# Patient Record
Sex: FEMALE | Race: WHITE | Employment: OTHER | URBAN - METROPOLITAN AREA
[De-identification: names, ages, dates, MRNs, and addresses within clinical notes are randomized per-mention and may not be internally consistent; named-entity substitution may affect disease eponyms.]

---

## 2017-10-24 ENCOUNTER — OFFICE VISIT (OUTPATIENT)
Dept: ORTHOPEDIC SURGERY | Age: 47
End: 2017-10-24

## 2017-10-24 VITALS
HEIGHT: 69 IN | DIASTOLIC BLOOD PRESSURE: 69 MMHG | SYSTOLIC BLOOD PRESSURE: 108 MMHG | HEART RATE: 55 BPM | WEIGHT: 150 LBS | BODY MASS INDEX: 22.22 KG/M2

## 2017-10-24 DIAGNOSIS — M84.353A STRESS FRACTURE OF FEMUR, INITIAL ENCOUNTER: Primary | ICD-10-CM

## 2017-10-24 DIAGNOSIS — M79.604 RIGHT LEG PAIN: ICD-10-CM

## 2017-10-24 PROCEDURE — 99203 OFFICE O/P NEW LOW 30 MIN: CPT | Performed by: ORTHOPAEDIC SURGERY

## 2017-10-24 PROCEDURE — G8420 CALC BMI NORM PARAMETERS: HCPCS | Performed by: ORTHOPAEDIC SURGERY

## 2017-10-24 PROCEDURE — G8427 DOCREV CUR MEDS BY ELIG CLIN: HCPCS | Performed by: ORTHOPAEDIC SURGERY

## 2017-10-24 PROCEDURE — G8484 FLU IMMUNIZE NO ADMIN: HCPCS | Performed by: ORTHOPAEDIC SURGERY

## 2017-10-24 PROCEDURE — 1036F TOBACCO NON-USER: CPT | Performed by: ORTHOPAEDIC SURGERY

## 2017-10-24 ASSESSMENT — ENCOUNTER SYMPTOMS
BACK PAIN: 0
EYES NEGATIVE: 1
RESPIRATORY NEGATIVE: 1
GASTROINTESTINAL NEGATIVE: 1

## 2017-10-24 NOTE — PROGRESS NOTES
sensory deficit. Skin:    Head/Neck: inspection reveals no rashes, ulcerations or lesions. Trunk:  inspection reveals no rashes, ulcerations or lesions. Right Lower Extremity: inspection reveals no rashes, ulcerations or lesions. Left Lower Extremity: inspection reveals no rashes, ulcerations or lesions. Examination of the bilateral hips reveals normal flexion and extension. There is no restriction in rotation. There is no tenderness to palpation anteriorly posteriorly or laterally. Left thigh is benign. She has no tenderness. She has full range of motion. She has good strength. On the right side she has mild discomfort in the quad with straight leg raise at 80°. She has some mild tenderness. The torsional load placed across the femur she does have discomfort. Knee exam is normal.  Calf exam is normal.    Right femur x-rays obtained today AP and lateral views demonstrate:  Some mild irregularity at the junction of the middle and proximal third of the femur concerning for stress fracture    Assessment:    right femur stress fracture versus quadriceps strain          Plan: We need to proceed with an MRI scan to fully evaluate this injury. In the meantime she is restricted to no running. Follow-up with me upon completion of the MRI. This note was created using voice recognition software. It has been proofread, but occasionally errors remain. Please disregard these errors. They will be corrected as they are noted.

## 2017-10-27 ENCOUNTER — TELEPHONE (OUTPATIENT)
Dept: ORTHOPEDIC SURGERY | Age: 47
End: 2017-10-27

## 2017-10-27 NOTE — TELEPHONE ENCOUNTER
Called patient regarding appointment on Monday. No answer and voicemail box full. Unable to leave message to cancel appointment.

## 2017-10-30 ENCOUNTER — TELEPHONE (OUTPATIENT)
Dept: ORTHOPEDIC SURGERY | Age: 47
End: 2017-10-30

## 2017-10-30 NOTE — TELEPHONE ENCOUNTER
Returned call to patient. Unable to leave message voicemail is full. Note completed for patient to defer marathon. Dr. Ana Kimball notified.

## 2017-11-09 ENCOUNTER — OFFICE VISIT (OUTPATIENT)
Dept: ORTHOPEDIC SURGERY | Age: 47
End: 2017-11-09

## 2017-11-09 VITALS
WEIGHT: 150 LBS | DIASTOLIC BLOOD PRESSURE: 54 MMHG | BODY MASS INDEX: 22.22 KG/M2 | SYSTOLIC BLOOD PRESSURE: 114 MMHG | HEIGHT: 69 IN | HEART RATE: 53 BPM

## 2017-11-09 DIAGNOSIS — M84.353A STRESS FRACTURE OF FEMUR, INITIAL ENCOUNTER: ICD-10-CM

## 2017-11-09 DIAGNOSIS — M79.604 RIGHT LEG PAIN: Primary | ICD-10-CM

## 2017-11-09 PROCEDURE — G8484 FLU IMMUNIZE NO ADMIN: HCPCS | Performed by: ORTHOPAEDIC SURGERY

## 2017-11-09 PROCEDURE — G8427 DOCREV CUR MEDS BY ELIG CLIN: HCPCS | Performed by: ORTHOPAEDIC SURGERY

## 2017-11-09 PROCEDURE — G8420 CALC BMI NORM PARAMETERS: HCPCS | Performed by: ORTHOPAEDIC SURGERY

## 2017-11-09 PROCEDURE — 99213 OFFICE O/P EST LOW 20 MIN: CPT | Performed by: ORTHOPAEDIC SURGERY

## 2017-11-09 PROCEDURE — 1036F TOBACCO NON-USER: CPT | Performed by: ORTHOPAEDIC SURGERY

## 2017-11-09 NOTE — PROGRESS NOTES
Comfort Phillip returns today for her right femur. Pain is now about 2 out of 10. She is feeling a lot better. She no longer has pain with regular walking. She stumbled a few days ago and had mild discomfort but it resolved quickly. Patient's medications, allergies, past medical, surgical, social and family histories were reviewed and updated as appropriate. Relevant review of systems reviewed. General Exam:    Vitals: Blood pressure (!) 114/54, pulse 53, height 5' 9\" (1.753 m), weight 150 lb (68 kg), not currently breastfeeding. Constitutional: Patient is adequately groomed with no evidence of malnutrition  Mental Status: The patient is oriented to time, place and person. The patient's mood and affect are appropriate. She walks with normal gait. Left femur exam is normal.  There is no tenderness or discomfort. There is no restriction of motion. On the right side she has no tenderness. She is no pain with loading the femur stress applied or with axial loading. Neurologic and vascular exams of the right hip and knee are normal.    MRI scan of the right femur is reviewed. It demonstrates:  Periosteal edema or periosteal reaction along the medial aspect of the proximal to mid right    femoral diaphysis and trace medullary edema for which evolving stress injury or evolving    stress fracture is favored. No completed fracture. Nutrient vessel contributes to the signal    pattern within the femur. I reviewed these findings at length and the report and images with the patient. Assessment: Right femoral stress reaction. Plan: In my opinion she needs to continue activity modification. We discussed all these findings at length. Total face-to-face visit time was approximately 15 minutes. I recommend she take calcium with vitamin D supplementation. I'm recommending that she obtain a primary care doctor as well.   She can use the exercise bike or rowing machine for the next 6 weeks.  She can start elliptical  in December. She should not run until January. Follow-up with me in about 6 weeks. She agrees with this plan. This note was created using voice recognition software. It has been proofread, but occasionally errors remain. Please disregard these errors. They will be corrected as they are noted.

## 2019-01-15 ENCOUNTER — OFFICE VISIT (OUTPATIENT)
Dept: ORTHOPEDIC SURGERY | Age: 49
End: 2019-01-15
Payer: COMMERCIAL

## 2019-01-15 VITALS
SYSTOLIC BLOOD PRESSURE: 106 MMHG | BODY MASS INDEX: 23.7 KG/M2 | DIASTOLIC BLOOD PRESSURE: 61 MMHG | WEIGHT: 160 LBS | HEART RATE: 51 BPM | HEIGHT: 69 IN

## 2019-01-15 DIAGNOSIS — M70.51 PES ANSERINUS BURSITIS OF RIGHT KNEE: ICD-10-CM

## 2019-01-15 DIAGNOSIS — S83.241A ACUTE MEDIAL MENISCUS TEAR OF RIGHT KNEE, INITIAL ENCOUNTER: ICD-10-CM

## 2019-01-15 DIAGNOSIS — M25.561 ACUTE PAIN OF RIGHT KNEE: Primary | ICD-10-CM

## 2019-01-15 PROCEDURE — G8427 DOCREV CUR MEDS BY ELIG CLIN: HCPCS | Performed by: ORTHOPAEDIC SURGERY

## 2019-01-15 PROCEDURE — G8420 CALC BMI NORM PARAMETERS: HCPCS | Performed by: ORTHOPAEDIC SURGERY

## 2019-01-15 PROCEDURE — 1036F TOBACCO NON-USER: CPT | Performed by: ORTHOPAEDIC SURGERY

## 2019-01-15 PROCEDURE — G8484 FLU IMMUNIZE NO ADMIN: HCPCS | Performed by: ORTHOPAEDIC SURGERY

## 2019-01-15 PROCEDURE — 3017F COLORECTAL CA SCREEN DOC REV: CPT | Performed by: ORTHOPAEDIC SURGERY

## 2019-01-15 PROCEDURE — 99214 OFFICE O/P EST MOD 30 MIN: CPT | Performed by: ORTHOPAEDIC SURGERY

## 2019-01-15 ASSESSMENT — ENCOUNTER SYMPTOMS
RESPIRATORY NEGATIVE: 1
EYES NEGATIVE: 1
ALLERGIC/IMMUNOLOGIC NEGATIVE: 1
GASTROINTESTINAL NEGATIVE: 1

## 2019-01-17 ENCOUNTER — HOSPITAL ENCOUNTER (OUTPATIENT)
Dept: PHYSICAL THERAPY | Age: 49
Setting detail: THERAPIES SERIES
Discharge: HOME | End: 2019-01-17
Payer: COMMERCIAL

## 2019-01-17 PROCEDURE — 97112 NEUROMUSCULAR REEDUCATION: CPT

## 2019-01-17 PROCEDURE — 97140 MANUAL THERAPY 1/> REGIONS: CPT

## 2019-01-17 PROCEDURE — 97161 PT EVAL LOW COMPLEX 20 MIN: CPT

## 2019-01-17 PROCEDURE — G8978 MOBILITY CURRENT STATUS: HCPCS

## 2019-01-17 PROCEDURE — G8979 MOBILITY GOAL STATUS: HCPCS

## 2019-01-17 PROCEDURE — 97110 THERAPEUTIC EXERCISES: CPT

## 2019-01-24 ENCOUNTER — HOSPITAL ENCOUNTER (OUTPATIENT)
Dept: PHYSICAL THERAPY | Age: 49
Setting detail: THERAPIES SERIES
Discharge: HOME | End: 2019-01-24
Payer: COMMERCIAL

## 2019-01-24 PROCEDURE — 97140 MANUAL THERAPY 1/> REGIONS: CPT

## 2019-01-24 PROCEDURE — 97110 THERAPEUTIC EXERCISES: CPT

## 2019-01-24 PROCEDURE — 97112 NEUROMUSCULAR REEDUCATION: CPT

## 2019-01-31 ENCOUNTER — APPOINTMENT (OUTPATIENT)
Dept: PHYSICAL THERAPY | Age: 49
End: 2019-01-31
Payer: COMMERCIAL

## 2019-02-07 ENCOUNTER — HOSPITAL ENCOUNTER (OUTPATIENT)
Dept: PHYSICAL THERAPY | Age: 49
Setting detail: THERAPIES SERIES
Discharge: HOME | End: 2019-02-07
Payer: COMMERCIAL

## 2019-02-07 PROCEDURE — 97140 MANUAL THERAPY 1/> REGIONS: CPT

## 2019-02-07 PROCEDURE — 97110 THERAPEUTIC EXERCISES: CPT

## 2019-02-13 ENCOUNTER — HOSPITAL ENCOUNTER (OUTPATIENT)
Dept: PHYSICAL THERAPY | Age: 49
Setting detail: THERAPIES SERIES
Discharge: HOME | End: 2019-02-13
Payer: COMMERCIAL

## 2019-02-13 PROCEDURE — 97110 THERAPEUTIC EXERCISES: CPT

## 2019-02-13 PROCEDURE — 97140 MANUAL THERAPY 1/> REGIONS: CPT

## 2019-02-13 PROCEDURE — 97112 NEUROMUSCULAR REEDUCATION: CPT

## 2019-02-13 PROCEDURE — 97530 THERAPEUTIC ACTIVITIES: CPT

## 2019-02-14 ENCOUNTER — OFFICE VISIT (OUTPATIENT)
Dept: ORTHOPEDIC SURGERY | Age: 49
End: 2019-02-14
Payer: COMMERCIAL

## 2019-02-14 VITALS
HEIGHT: 69 IN | BODY MASS INDEX: 23.7 KG/M2 | HEART RATE: 59 BPM | SYSTOLIC BLOOD PRESSURE: 104 MMHG | WEIGHT: 160 LBS | DIASTOLIC BLOOD PRESSURE: 62 MMHG

## 2019-02-14 DIAGNOSIS — S83.241A ACUTE MEDIAL MENISCUS TEAR OF RIGHT KNEE, INITIAL ENCOUNTER: ICD-10-CM

## 2019-02-14 DIAGNOSIS — M25.561 ACUTE PAIN OF RIGHT KNEE: Primary | ICD-10-CM

## 2019-02-14 PROCEDURE — 3017F COLORECTAL CA SCREEN DOC REV: CPT | Performed by: ORTHOPAEDIC SURGERY

## 2019-02-14 PROCEDURE — G8420 CALC BMI NORM PARAMETERS: HCPCS | Performed by: ORTHOPAEDIC SURGERY

## 2019-02-14 PROCEDURE — 1036F TOBACCO NON-USER: CPT | Performed by: ORTHOPAEDIC SURGERY

## 2019-02-14 PROCEDURE — G8484 FLU IMMUNIZE NO ADMIN: HCPCS | Performed by: ORTHOPAEDIC SURGERY

## 2019-02-14 PROCEDURE — 99213 OFFICE O/P EST LOW 20 MIN: CPT | Performed by: ORTHOPAEDIC SURGERY

## 2019-02-14 PROCEDURE — G8427 DOCREV CUR MEDS BY ELIG CLIN: HCPCS | Performed by: ORTHOPAEDIC SURGERY

## 2019-02-20 ENCOUNTER — TELEPHONE (OUTPATIENT)
Dept: ORTHOPEDIC SURGERY | Age: 49
End: 2019-02-20

## 2019-03-04 ENCOUNTER — OFFICE VISIT (OUTPATIENT)
Dept: ORTHOPEDIC SURGERY | Age: 49
End: 2019-03-04
Payer: COMMERCIAL

## 2019-03-04 VITALS
WEIGHT: 160 LBS | DIASTOLIC BLOOD PRESSURE: 60 MMHG | BODY MASS INDEX: 23.7 KG/M2 | HEIGHT: 69 IN | HEART RATE: 62 BPM | SYSTOLIC BLOOD PRESSURE: 103 MMHG

## 2019-03-04 DIAGNOSIS — M25.561 ACUTE PAIN OF RIGHT KNEE: Primary | ICD-10-CM

## 2019-03-04 DIAGNOSIS — S83.241A ACUTE MEDIAL MENISCUS TEAR OF RIGHT KNEE, INITIAL ENCOUNTER: ICD-10-CM

## 2019-03-04 PROCEDURE — 1036F TOBACCO NON-USER: CPT | Performed by: ORTHOPAEDIC SURGERY

## 2019-03-04 PROCEDURE — 99213 OFFICE O/P EST LOW 20 MIN: CPT | Performed by: ORTHOPAEDIC SURGERY

## 2019-03-04 PROCEDURE — G8427 DOCREV CUR MEDS BY ELIG CLIN: HCPCS | Performed by: ORTHOPAEDIC SURGERY

## 2019-03-04 PROCEDURE — G8484 FLU IMMUNIZE NO ADMIN: HCPCS | Performed by: ORTHOPAEDIC SURGERY

## 2019-03-04 PROCEDURE — 3017F COLORECTAL CA SCREEN DOC REV: CPT | Performed by: ORTHOPAEDIC SURGERY

## 2019-03-04 PROCEDURE — G8420 CALC BMI NORM PARAMETERS: HCPCS | Performed by: ORTHOPAEDIC SURGERY

## 2019-10-07 ENCOUNTER — OFFICE VISIT (OUTPATIENT)
Dept: ORTHOPEDIC SURGERY | Age: 49
End: 2019-10-07
Payer: COMMERCIAL

## 2019-10-07 VITALS
BODY MASS INDEX: 23.7 KG/M2 | SYSTOLIC BLOOD PRESSURE: 118 MMHG | HEIGHT: 69 IN | DIASTOLIC BLOOD PRESSURE: 57 MMHG | HEART RATE: 52 BPM | WEIGHT: 160 LBS

## 2019-10-07 DIAGNOSIS — M25.461 EFFUSION OF RIGHT KNEE: ICD-10-CM

## 2019-10-07 DIAGNOSIS — S83.241A ACUTE MEDIAL MENISCUS TEAR OF RIGHT KNEE, INITIAL ENCOUNTER: ICD-10-CM

## 2019-10-07 DIAGNOSIS — M25.561 ACUTE PAIN OF RIGHT KNEE: Primary | ICD-10-CM

## 2019-10-07 PROCEDURE — 3017F COLORECTAL CA SCREEN DOC REV: CPT | Performed by: ORTHOPAEDIC SURGERY

## 2019-10-07 PROCEDURE — 99213 OFFICE O/P EST LOW 20 MIN: CPT | Performed by: ORTHOPAEDIC SURGERY

## 2019-10-07 PROCEDURE — 1036F TOBACCO NON-USER: CPT | Performed by: ORTHOPAEDIC SURGERY

## 2019-10-07 PROCEDURE — G8427 DOCREV CUR MEDS BY ELIG CLIN: HCPCS | Performed by: ORTHOPAEDIC SURGERY

## 2019-10-07 PROCEDURE — G8420 CALC BMI NORM PARAMETERS: HCPCS | Performed by: ORTHOPAEDIC SURGERY

## 2019-10-07 PROCEDURE — G8484 FLU IMMUNIZE NO ADMIN: HCPCS | Performed by: ORTHOPAEDIC SURGERY

## 2019-10-10 ENCOUNTER — OFFICE VISIT (OUTPATIENT)
Dept: INTERNAL MEDICINE CLINIC | Age: 49
End: 2019-10-10
Payer: COMMERCIAL

## 2019-10-10 VITALS
HEIGHT: 69 IN | HEART RATE: 54 BPM | WEIGHT: 163.4 LBS | DIASTOLIC BLOOD PRESSURE: 68 MMHG | SYSTOLIC BLOOD PRESSURE: 103 MMHG | TEMPERATURE: 97.9 F | OXYGEN SATURATION: 98 % | BODY MASS INDEX: 24.2 KG/M2 | RESPIRATION RATE: 16 BRPM

## 2019-10-10 DIAGNOSIS — E78.2 MIXED HYPERLIPIDEMIA: ICD-10-CM

## 2019-10-10 DIAGNOSIS — S83.206D ACUTE TORN MENISCUS OF KNEE, RIGHT, SUBSEQUENT ENCOUNTER: ICD-10-CM

## 2019-10-10 DIAGNOSIS — Z76.89 ENCOUNTER TO ESTABLISH CARE: Primary | ICD-10-CM

## 2019-10-10 DIAGNOSIS — K58.0 IRRITABLE BOWEL SYNDROME WITH DIARRHEA: ICD-10-CM

## 2019-10-10 PROCEDURE — 3017F COLORECTAL CA SCREEN DOC REV: CPT | Performed by: NURSE PRACTITIONER

## 2019-10-10 PROCEDURE — G8420 CALC BMI NORM PARAMETERS: HCPCS | Performed by: NURSE PRACTITIONER

## 2019-10-10 PROCEDURE — 1036F TOBACCO NON-USER: CPT | Performed by: NURSE PRACTITIONER

## 2019-10-10 PROCEDURE — G8427 DOCREV CUR MEDS BY ELIG CLIN: HCPCS | Performed by: NURSE PRACTITIONER

## 2019-10-10 PROCEDURE — G8484 FLU IMMUNIZE NO ADMIN: HCPCS | Performed by: NURSE PRACTITIONER

## 2019-10-10 PROCEDURE — 99205 OFFICE O/P NEW HI 60 MIN: CPT | Performed by: NURSE PRACTITIONER

## 2019-10-10 SDOH — HEALTH STABILITY: MENTAL HEALTH: HOW OFTEN DO YOU HAVE A DRINK CONTAINING ALCOHOL?: MONTHLY OR LESS

## 2019-10-10 ASSESSMENT — ENCOUNTER SYMPTOMS
BACK PAIN: 0
CHEST TIGHTNESS: 0
SHORTNESS OF BREATH: 0
SINUS PRESSURE: 0
VOMITING: 0
SINUS PAIN: 0
ABDOMINAL PAIN: 0
RHINORRHEA: 0
WHEEZING: 0
TROUBLE SWALLOWING: 0
COUGH: 0
NAUSEA: 0
COLOR CHANGE: 0
EYE PAIN: 0
CONSTIPATION: 0
SORE THROAT: 0
PHOTOPHOBIA: 0
DIARRHEA: 0

## 2019-10-10 ASSESSMENT — PATIENT HEALTH QUESTIONNAIRE - PHQ9
SUM OF ALL RESPONSES TO PHQ QUESTIONS 1-9: 0
1. LITTLE INTEREST OR PLEASURE IN DOING THINGS: 0
2. FEELING DOWN, DEPRESSED OR HOPELESS: 0
SUM OF ALL RESPONSES TO PHQ QUESTIONS 1-9: 0
SUM OF ALL RESPONSES TO PHQ9 QUESTIONS 1 & 2: 0

## 2019-10-11 ENCOUNTER — TELEPHONE (OUTPATIENT)
Dept: ORTHOPEDIC SURGERY | Age: 49
End: 2019-10-11

## 2019-10-15 ENCOUNTER — OFFICE VISIT (OUTPATIENT)
Dept: INTERNAL MEDICINE CLINIC | Age: 49
End: 2019-10-15
Payer: COMMERCIAL

## 2019-10-15 VITALS
TEMPERATURE: 97.7 F | SYSTOLIC BLOOD PRESSURE: 110 MMHG | OXYGEN SATURATION: 100 % | HEIGHT: 69 IN | BODY MASS INDEX: 23.99 KG/M2 | HEART RATE: 54 BPM | WEIGHT: 162 LBS | DIASTOLIC BLOOD PRESSURE: 70 MMHG | RESPIRATION RATE: 16 BRPM

## 2019-10-15 DIAGNOSIS — S83.241S ACUTE MEDIAL MENISCUS TEAR OF RIGHT KNEE, SEQUELA: ICD-10-CM

## 2019-10-15 DIAGNOSIS — E78.2 MIXED HYPERLIPIDEMIA: ICD-10-CM

## 2019-10-15 DIAGNOSIS — Z01.818 PRE-OP EXAMINATION: Primary | ICD-10-CM

## 2019-10-15 PROCEDURE — 93000 ELECTROCARDIOGRAM COMPLETE: CPT | Performed by: NURSE PRACTITIONER

## 2019-10-15 PROCEDURE — 1036F TOBACCO NON-USER: CPT | Performed by: NURSE PRACTITIONER

## 2019-10-15 PROCEDURE — 3017F COLORECTAL CA SCREEN DOC REV: CPT | Performed by: NURSE PRACTITIONER

## 2019-10-15 PROCEDURE — 99215 OFFICE O/P EST HI 40 MIN: CPT | Performed by: NURSE PRACTITIONER

## 2019-10-15 PROCEDURE — G8427 DOCREV CUR MEDS BY ELIG CLIN: HCPCS | Performed by: NURSE PRACTITIONER

## 2019-10-15 PROCEDURE — G8420 CALC BMI NORM PARAMETERS: HCPCS | Performed by: NURSE PRACTITIONER

## 2019-10-15 PROCEDURE — G8484 FLU IMMUNIZE NO ADMIN: HCPCS | Performed by: NURSE PRACTITIONER

## 2019-10-15 ASSESSMENT — ENCOUNTER SYMPTOMS
ABDOMINAL PAIN: 0
PHOTOPHOBIA: 0
SINUS PAIN: 0
DIARRHEA: 0
EYE PAIN: 0
RHINORRHEA: 0
SORE THROAT: 0
NAUSEA: 0
SINUS PRESSURE: 0
SHORTNESS OF BREATH: 0
BACK PAIN: 0
CHEST TIGHTNESS: 0
COUGH: 0
TROUBLE SWALLOWING: 0
CONSTIPATION: 0
COLOR CHANGE: 0
VOMITING: 0
WHEEZING: 0

## 2019-10-22 ENCOUNTER — TELEPHONE (OUTPATIENT)
Dept: ORTHOPEDIC SURGERY | Age: 49
End: 2019-10-22

## 2019-10-22 ENCOUNTER — ANESTHESIA EVENT (OUTPATIENT)
Dept: OPERATING ROOM | Age: 49
End: 2019-10-22
Payer: COMMERCIAL

## 2019-10-22 DIAGNOSIS — S83.241A ACUTE MEDIAL MENISCUS TEAR OF RIGHT KNEE, INITIAL ENCOUNTER: ICD-10-CM

## 2019-10-22 DIAGNOSIS — M25.561 ACUTE PAIN OF RIGHT KNEE: Primary | ICD-10-CM

## 2019-10-22 DIAGNOSIS — M25.461 EFFUSION OF RIGHT KNEE: ICD-10-CM

## 2019-10-22 RX ORDER — HYDROCODONE BITARTRATE AND ACETAMINOPHEN 5; 325 MG/1; MG/1
1 TABLET ORAL EVERY 6 HOURS PRN
Qty: 15 TABLET | Refills: 0 | Status: SHIPPED | OUTPATIENT
Start: 2019-10-23 | End: 2019-10-28

## 2019-10-22 RX ORDER — PROMETHAZINE HYDROCHLORIDE 25 MG/1
25 TABLET ORAL EVERY 6 HOURS PRN
Qty: 30 TABLET | Refills: 0 | Status: SHIPPED | OUTPATIENT
Start: 2019-10-23 | End: 2019-10-29

## 2019-10-22 RX ORDER — DOCUSATE SODIUM 100 MG/1
100 CAPSULE, LIQUID FILLED ORAL 2 TIMES DAILY
Qty: 60 CAPSULE | Refills: 0 | Status: SHIPPED | OUTPATIENT
Start: 2019-10-23 | End: 2020-05-08 | Stop reason: ALTCHOICE

## 2019-10-23 ENCOUNTER — ANESTHESIA (OUTPATIENT)
Dept: OPERATING ROOM | Age: 49
End: 2019-10-23
Payer: COMMERCIAL

## 2019-10-23 ENCOUNTER — HOSPITAL ENCOUNTER (OUTPATIENT)
Age: 49
Setting detail: OUTPATIENT SURGERY
Discharge: HOME | End: 2019-10-23
Attending: ORTHOPAEDIC SURGERY | Admitting: ORTHOPAEDIC SURGERY
Payer: COMMERCIAL

## 2019-10-23 VITALS
TEMPERATURE: 96.8 F | OXYGEN SATURATION: 99 % | RESPIRATION RATE: 1 BRPM | SYSTOLIC BLOOD PRESSURE: 85 MMHG | DIASTOLIC BLOOD PRESSURE: 52 MMHG

## 2019-10-23 VITALS
OXYGEN SATURATION: 97 % | WEIGHT: 159.61 LBS | TEMPERATURE: 97.4 F | BODY MASS INDEX: 22.85 KG/M2 | HEIGHT: 70 IN | RESPIRATION RATE: 16 BRPM | HEART RATE: 56 BPM | SYSTOLIC BLOOD PRESSURE: 120 MMHG | DIASTOLIC BLOOD PRESSURE: 75 MMHG

## 2019-10-23 PROCEDURE — 2500000003 HC RX 250 WO HCPCS

## 2019-10-23 PROCEDURE — 3600000004 HC SURGERY LEVEL 4 BASE: Performed by: ORTHOPAEDIC SURGERY

## 2019-10-23 PROCEDURE — 7100000001 HC PACU RECOVERY - ADDTL 15 MIN: Performed by: ORTHOPAEDIC SURGERY

## 2019-10-23 PROCEDURE — 6360000002 HC RX W HCPCS: Performed by: ANESTHESIOLOGY

## 2019-10-23 PROCEDURE — 2500000003 HC RX 250 WO HCPCS: Performed by: ORTHOPAEDIC SURGERY

## 2019-10-23 PROCEDURE — 2580000003 HC RX 258: Performed by: ANESTHESIOLOGY

## 2019-10-23 PROCEDURE — 6360000002 HC RX W HCPCS

## 2019-10-23 PROCEDURE — 6360000002 HC RX W HCPCS: Performed by: ORTHOPAEDIC SURGERY

## 2019-10-23 PROCEDURE — 3600000014 HC SURGERY LEVEL 4 ADDTL 15MIN: Performed by: ORTHOPAEDIC SURGERY

## 2019-10-23 PROCEDURE — 2720000010 HC SURG SUPPLY STERILE: Performed by: ORTHOPAEDIC SURGERY

## 2019-10-23 PROCEDURE — 3700000000 HC ANESTHESIA ATTENDED CARE: Performed by: ORTHOPAEDIC SURGERY

## 2019-10-23 PROCEDURE — 2580000003 HC RX 258: Performed by: ORTHOPAEDIC SURGERY

## 2019-10-23 PROCEDURE — 7100000000 HC PACU RECOVERY - FIRST 15 MIN: Performed by: ORTHOPAEDIC SURGERY

## 2019-10-23 PROCEDURE — 7100000011 HC PHASE II RECOVERY - ADDTL 15 MIN: Performed by: ORTHOPAEDIC SURGERY

## 2019-10-23 PROCEDURE — 2709999900 HC NON-CHARGEABLE SUPPLY: Performed by: ORTHOPAEDIC SURGERY

## 2019-10-23 PROCEDURE — 7100000010 HC PHASE II RECOVERY - FIRST 15 MIN: Performed by: ORTHOPAEDIC SURGERY

## 2019-10-23 PROCEDURE — 3700000001 HC ADD 15 MINUTES (ANESTHESIA): Performed by: ORTHOPAEDIC SURGERY

## 2019-10-23 RX ORDER — SODIUM CHLORIDE 0.9 % (FLUSH) 0.9 %
10 SYRINGE (ML) INJECTION PRN
Status: DISCONTINUED | OUTPATIENT
Start: 2019-10-23 | End: 2019-10-23 | Stop reason: HOSPADM

## 2019-10-23 RX ORDER — ONDANSETRON 2 MG/ML
INJECTION INTRAMUSCULAR; INTRAVENOUS PRN
Status: DISCONTINUED | OUTPATIENT
Start: 2019-10-23 | End: 2019-10-23 | Stop reason: SDUPTHER

## 2019-10-23 RX ORDER — OXYCODONE HYDROCHLORIDE AND ACETAMINOPHEN 5; 325 MG/1; MG/1
1 TABLET ORAL PRN
Status: DISCONTINUED | OUTPATIENT
Start: 2019-10-23 | End: 2019-10-23 | Stop reason: HOSPADM

## 2019-10-23 RX ORDER — ONDANSETRON 2 MG/ML
4 INJECTION INTRAMUSCULAR; INTRAVENOUS
Status: DISCONTINUED | OUTPATIENT
Start: 2019-10-23 | End: 2019-10-23 | Stop reason: HOSPADM

## 2019-10-23 RX ORDER — GLYCOPYRROLATE 0.2 MG/ML
INJECTION INTRAMUSCULAR; INTRAVENOUS PRN
Status: DISCONTINUED | OUTPATIENT
Start: 2019-10-23 | End: 2019-10-23 | Stop reason: SDUPTHER

## 2019-10-23 RX ORDER — SODIUM CHLORIDE 0.9 % (FLUSH) 0.9 %
10 SYRINGE (ML) INJECTION EVERY 12 HOURS SCHEDULED
Status: DISCONTINUED | OUTPATIENT
Start: 2019-10-23 | End: 2019-10-23 | Stop reason: HOSPADM

## 2019-10-23 RX ORDER — FENTANYL CITRATE 50 UG/ML
INJECTION, SOLUTION INTRAMUSCULAR; INTRAVENOUS PRN
Status: DISCONTINUED | OUTPATIENT
Start: 2019-10-23 | End: 2019-10-23 | Stop reason: SDUPTHER

## 2019-10-23 RX ORDER — BUPIVACAINE HYDROCHLORIDE AND EPINEPHRINE 2.5; 5 MG/ML; UG/ML
INJECTION, SOLUTION EPIDURAL; INFILTRATION; INTRACAUDAL; PERINEURAL
Status: COMPLETED | OUTPATIENT
Start: 2019-10-23 | End: 2019-10-23

## 2019-10-23 RX ORDER — FENTANYL CITRATE 50 UG/ML
25 INJECTION, SOLUTION INTRAMUSCULAR; INTRAVENOUS EVERY 5 MIN PRN
Status: DISCONTINUED | OUTPATIENT
Start: 2019-10-23 | End: 2019-10-23 | Stop reason: HOSPADM

## 2019-10-23 RX ORDER — SODIUM CHLORIDE, SODIUM LACTATE, POTASSIUM CHLORIDE, CALCIUM CHLORIDE 600; 310; 30; 20 MG/100ML; MG/100ML; MG/100ML; MG/100ML
INJECTION, SOLUTION INTRAVENOUS CONTINUOUS PRN
Status: COMPLETED | OUTPATIENT
Start: 2019-10-23 | End: 2019-10-23

## 2019-10-23 RX ORDER — OXYCODONE HYDROCHLORIDE AND ACETAMINOPHEN 5; 325 MG/1; MG/1
2 TABLET ORAL PRN
Status: DISCONTINUED | OUTPATIENT
Start: 2019-10-23 | End: 2019-10-23 | Stop reason: HOSPADM

## 2019-10-23 RX ORDER — APREPITANT 40 MG/1
40 CAPSULE ORAL ONCE
Status: COMPLETED | OUTPATIENT
Start: 2019-10-23 | End: 2019-10-23

## 2019-10-23 RX ORDER — PROPOFOL 10 MG/ML
INJECTION, EMULSION INTRAVENOUS PRN
Status: DISCONTINUED | OUTPATIENT
Start: 2019-10-23 | End: 2019-10-23 | Stop reason: SDUPTHER

## 2019-10-23 RX ORDER — SODIUM CHLORIDE 9 MG/ML
INJECTION, SOLUTION INTRAVENOUS CONTINUOUS
Status: DISCONTINUED | OUTPATIENT
Start: 2019-10-23 | End: 2019-10-23 | Stop reason: HOSPADM

## 2019-10-23 RX ORDER — KETOROLAC TROMETHAMINE 30 MG/ML
INJECTION, SOLUTION INTRAMUSCULAR; INTRAVENOUS PRN
Status: DISCONTINUED | OUTPATIENT
Start: 2019-10-23 | End: 2019-10-23 | Stop reason: SDUPTHER

## 2019-10-23 RX ORDER — DEXAMETHASONE SODIUM PHOSPHATE 4 MG/ML
INJECTION, SOLUTION INTRA-ARTICULAR; INTRALESIONAL; INTRAMUSCULAR; INTRAVENOUS; SOFT TISSUE PRN
Status: DISCONTINUED | OUTPATIENT
Start: 2019-10-23 | End: 2019-10-23 | Stop reason: SDUPTHER

## 2019-10-23 RX ORDER — LIDOCAINE HYDROCHLORIDE 20 MG/ML
INJECTION, SOLUTION EPIDURAL; INFILTRATION; INTRACAUDAL; PERINEURAL PRN
Status: DISCONTINUED | OUTPATIENT
Start: 2019-10-23 | End: 2019-10-23 | Stop reason: SDUPTHER

## 2019-10-23 RX ORDER — MIDAZOLAM HYDROCHLORIDE 1 MG/ML
INJECTION INTRAMUSCULAR; INTRAVENOUS PRN
Status: DISCONTINUED | OUTPATIENT
Start: 2019-10-23 | End: 2019-10-23 | Stop reason: SDUPTHER

## 2019-10-23 RX ADMIN — PROPOFOL 150 MG: 10 INJECTION, EMULSION INTRAVENOUS at 11:51

## 2019-10-23 RX ADMIN — SODIUM CHLORIDE: 9 INJECTION, SOLUTION INTRAVENOUS at 10:11

## 2019-10-23 RX ADMIN — SODIUM CHLORIDE: 9 INJECTION, SOLUTION INTRAVENOUS at 12:19

## 2019-10-23 RX ADMIN — FENTANYL CITRATE 50 MCG: 50 INJECTION INTRAMUSCULAR; INTRAVENOUS at 11:51

## 2019-10-23 RX ADMIN — Medication 2 G: at 11:40

## 2019-10-23 RX ADMIN — MIDAZOLAM 2 MG: 1 INJECTION INTRAMUSCULAR; INTRAVENOUS at 11:40

## 2019-10-23 RX ADMIN — FENTANYL CITRATE 50 MCG: 50 INJECTION INTRAMUSCULAR; INTRAVENOUS at 11:40

## 2019-10-23 RX ADMIN — DEXAMETHASONE SODIUM PHOSPHATE 8 MG: 4 INJECTION, SOLUTION INTRAMUSCULAR; INTRAVENOUS at 12:00

## 2019-10-23 RX ADMIN — ONDANSETRON 4 MG: 2 INJECTION INTRAMUSCULAR; INTRAVENOUS at 12:00

## 2019-10-23 RX ADMIN — GLYCOPYRROLATE 0.2 MG: 0.2 INJECTION, SOLUTION INTRAMUSCULAR; INTRAVENOUS at 11:51

## 2019-10-23 RX ADMIN — LIDOCAINE HYDROCHLORIDE 50 MG: 20 INJECTION, SOLUTION EPIDURAL; INFILTRATION; INTRACAUDAL; PERINEURAL at 11:51

## 2019-10-23 RX ADMIN — APREPITANT 40 MG: 40 CAPSULE ORAL at 10:21

## 2019-10-23 RX ADMIN — KETOROLAC TROMETHAMINE 30 MG: 30 INJECTION, SOLUTION INTRAMUSCULAR at 12:00

## 2019-10-23 ASSESSMENT — PULMONARY FUNCTION TESTS
PIF_VALUE: 8
PIF_VALUE: 3
PIF_VALUE: 0
PIF_VALUE: 14
PIF_VALUE: 2
PIF_VALUE: 2
PIF_VALUE: 14
PIF_VALUE: 2
PIF_VALUE: 12
PIF_VALUE: 14
PIF_VALUE: 12
PIF_VALUE: 16
PIF_VALUE: 1
PIF_VALUE: 12
PIF_VALUE: 16
PIF_VALUE: 1
PIF_VALUE: 3
PIF_VALUE: 14
PIF_VALUE: 1
PIF_VALUE: 12
PIF_VALUE: 3
PIF_VALUE: 0
PIF_VALUE: 12
PIF_VALUE: 0
PIF_VALUE: 14
PIF_VALUE: 14
PIF_VALUE: 0
PIF_VALUE: 5
PIF_VALUE: 1
PIF_VALUE: 2
PIF_VALUE: 14
PIF_VALUE: 16
PIF_VALUE: 8
PIF_VALUE: 2
PIF_VALUE: 2
PIF_VALUE: 7
PIF_VALUE: 14
PIF_VALUE: 14
PIF_VALUE: 2
PIF_VALUE: 3
PIF_VALUE: 12
PIF_VALUE: 2
PIF_VALUE: 2

## 2019-10-23 ASSESSMENT — PAIN SCALES - GENERAL
PAINLEVEL_OUTOF10: 0

## 2019-10-23 ASSESSMENT — PAIN - FUNCTIONAL ASSESSMENT
PAIN_FUNCTIONAL_ASSESSMENT: 0-10
PAIN_FUNCTIONAL_ASSESSMENT: ACTIVITIES ARE NOT PREVENTED

## 2019-10-23 ASSESSMENT — PAIN DESCRIPTION - DESCRIPTORS: DESCRIPTORS: CONSTANT;ACHING

## 2019-10-24 ENCOUNTER — OFFICE VISIT (OUTPATIENT)
Dept: ORTHOPEDIC SURGERY | Age: 49
End: 2019-10-24

## 2019-10-24 ENCOUNTER — HOSPITAL ENCOUNTER (OUTPATIENT)
Dept: PHYSICAL THERAPY | Age: 49
Setting detail: THERAPIES SERIES
Discharge: HOME | End: 2019-10-24
Payer: COMMERCIAL

## 2019-10-24 VITALS — HEIGHT: 69 IN | BODY MASS INDEX: 23.7 KG/M2 | WEIGHT: 160 LBS

## 2019-10-24 DIAGNOSIS — S83.241D ACUTE MEDIAL MENISCUS TEAR OF RIGHT KNEE, SUBSEQUENT ENCOUNTER: ICD-10-CM

## 2019-10-24 DIAGNOSIS — M25.561 ACUTE PAIN OF RIGHT KNEE: Primary | ICD-10-CM

## 2019-10-24 PROCEDURE — 99024 POSTOP FOLLOW-UP VISIT: CPT | Performed by: ORTHOPAEDIC SURGERY

## 2019-10-24 PROCEDURE — 97161 PT EVAL LOW COMPLEX 20 MIN: CPT | Performed by: PHYSICAL THERAPIST

## 2019-10-24 PROCEDURE — 97016 VASOPNEUMATIC DEVICE THERAPY: CPT | Performed by: PHYSICAL THERAPIST

## 2019-10-24 PROCEDURE — 97110 THERAPEUTIC EXERCISES: CPT | Performed by: PHYSICAL THERAPIST

## 2019-10-24 PROCEDURE — 97530 THERAPEUTIC ACTIVITIES: CPT | Performed by: PHYSICAL THERAPIST

## 2019-10-28 ENCOUNTER — HOSPITAL ENCOUNTER (OUTPATIENT)
Dept: PHYSICAL THERAPY | Age: 49
Setting detail: THERAPIES SERIES
Discharge: HOME | End: 2019-10-28
Payer: COMMERCIAL

## 2019-10-28 PROCEDURE — 97530 THERAPEUTIC ACTIVITIES: CPT | Performed by: PHYSICAL THERAPIST

## 2019-10-28 PROCEDURE — 97110 THERAPEUTIC EXERCISES: CPT | Performed by: PHYSICAL THERAPIST

## 2019-10-28 PROCEDURE — 97016 VASOPNEUMATIC DEVICE THERAPY: CPT | Performed by: PHYSICAL THERAPIST

## 2019-10-31 ENCOUNTER — OFFICE VISIT (OUTPATIENT)
Dept: ORTHOPEDIC SURGERY | Age: 49
End: 2019-10-31

## 2019-10-31 ENCOUNTER — HOSPITAL ENCOUNTER (OUTPATIENT)
Dept: PHYSICAL THERAPY | Age: 49
Setting detail: THERAPIES SERIES
Discharge: HOME | End: 2019-10-31
Payer: COMMERCIAL

## 2019-10-31 VITALS — HEIGHT: 69 IN | WEIGHT: 160 LBS | BODY MASS INDEX: 23.7 KG/M2

## 2019-10-31 DIAGNOSIS — S83.241D ACUTE MEDIAL MENISCUS TEAR OF RIGHT KNEE, SUBSEQUENT ENCOUNTER: Primary | ICD-10-CM

## 2019-10-31 PROCEDURE — 97110 THERAPEUTIC EXERCISES: CPT | Performed by: PHYSICAL THERAPIST

## 2019-10-31 PROCEDURE — 97016 VASOPNEUMATIC DEVICE THERAPY: CPT | Performed by: PHYSICAL THERAPIST

## 2019-10-31 PROCEDURE — 99024 POSTOP FOLLOW-UP VISIT: CPT | Performed by: ORTHOPAEDIC SURGERY

## 2019-10-31 PROCEDURE — 97530 THERAPEUTIC ACTIVITIES: CPT | Performed by: PHYSICAL THERAPIST

## 2019-11-05 ENCOUNTER — HOSPITAL ENCOUNTER (OUTPATIENT)
Dept: PHYSICAL THERAPY | Age: 49
Setting detail: THERAPIES SERIES
Discharge: HOME | End: 2019-11-05
Payer: COMMERCIAL

## 2019-11-05 PROCEDURE — 97112 NEUROMUSCULAR REEDUCATION: CPT | Performed by: PHYSICAL THERAPIST

## 2019-11-05 PROCEDURE — 97016 VASOPNEUMATIC DEVICE THERAPY: CPT | Performed by: PHYSICAL THERAPIST

## 2019-11-05 PROCEDURE — 97530 THERAPEUTIC ACTIVITIES: CPT | Performed by: PHYSICAL THERAPIST

## 2019-11-05 PROCEDURE — 97110 THERAPEUTIC EXERCISES: CPT | Performed by: PHYSICAL THERAPIST

## 2019-11-08 ENCOUNTER — HOSPITAL ENCOUNTER (OUTPATIENT)
Dept: PHYSICAL THERAPY | Age: 49
Setting detail: THERAPIES SERIES
Discharge: HOME | End: 2019-11-08
Payer: COMMERCIAL

## 2019-11-08 PROCEDURE — 97016 VASOPNEUMATIC DEVICE THERAPY: CPT

## 2019-11-08 PROCEDURE — 97112 NEUROMUSCULAR REEDUCATION: CPT

## 2019-11-08 PROCEDURE — 97530 THERAPEUTIC ACTIVITIES: CPT

## 2019-11-08 PROCEDURE — 97110 THERAPEUTIC EXERCISES: CPT

## 2019-11-20 ENCOUNTER — HOSPITAL ENCOUNTER (OUTPATIENT)
Dept: PHYSICAL THERAPY | Age: 49
Setting detail: THERAPIES SERIES
Discharge: HOME | End: 2019-11-20
Payer: COMMERCIAL

## 2019-11-20 PROCEDURE — 97530 THERAPEUTIC ACTIVITIES: CPT | Performed by: PHYSICAL THERAPIST

## 2019-11-20 PROCEDURE — 97112 NEUROMUSCULAR REEDUCATION: CPT | Performed by: PHYSICAL THERAPIST

## 2019-11-20 PROCEDURE — 97110 THERAPEUTIC EXERCISES: CPT | Performed by: PHYSICAL THERAPIST

## 2019-11-22 ENCOUNTER — HOSPITAL ENCOUNTER (OUTPATIENT)
Dept: PHYSICAL THERAPY | Age: 49
Setting detail: THERAPIES SERIES
Discharge: HOME | End: 2019-11-22
Payer: COMMERCIAL

## 2019-11-22 PROCEDURE — 97112 NEUROMUSCULAR REEDUCATION: CPT

## 2019-11-22 PROCEDURE — 97110 THERAPEUTIC EXERCISES: CPT

## 2019-11-22 PROCEDURE — 97530 THERAPEUTIC ACTIVITIES: CPT

## 2019-11-22 PROCEDURE — 97016 VASOPNEUMATIC DEVICE THERAPY: CPT

## 2019-11-25 ENCOUNTER — OFFICE VISIT (OUTPATIENT)
Dept: ORTHOPEDIC SURGERY | Age: 49
End: 2019-11-25

## 2019-11-25 ENCOUNTER — HOSPITAL ENCOUNTER (OUTPATIENT)
Dept: PHYSICAL THERAPY | Age: 49
Setting detail: THERAPIES SERIES
Discharge: HOME | End: 2019-11-25
Payer: COMMERCIAL

## 2019-11-25 VITALS
WEIGHT: 160 LBS | BODY MASS INDEX: 22.9 KG/M2 | SYSTOLIC BLOOD PRESSURE: 115 MMHG | HEART RATE: 69 BPM | HEIGHT: 70 IN | DIASTOLIC BLOOD PRESSURE: 70 MMHG

## 2019-11-25 DIAGNOSIS — S83.241D ACUTE MEDIAL MENISCUS TEAR OF RIGHT KNEE, SUBSEQUENT ENCOUNTER: Primary | ICD-10-CM

## 2019-11-25 DIAGNOSIS — M25.561 ACUTE PAIN OF RIGHT KNEE: ICD-10-CM

## 2019-11-25 PROCEDURE — 99024 POSTOP FOLLOW-UP VISIT: CPT | Performed by: ORTHOPAEDIC SURGERY

## 2019-11-25 PROCEDURE — 97530 THERAPEUTIC ACTIVITIES: CPT

## 2019-11-25 PROCEDURE — 97016 VASOPNEUMATIC DEVICE THERAPY: CPT

## 2019-11-25 PROCEDURE — 97112 NEUROMUSCULAR REEDUCATION: CPT

## 2019-11-25 PROCEDURE — 97110 THERAPEUTIC EXERCISES: CPT

## 2020-02-17 ENCOUNTER — TELEPHONE (OUTPATIENT)
Dept: INTERNAL MEDICINE CLINIC | Age: 50
End: 2020-02-17

## 2020-02-24 ENCOUNTER — OFFICE VISIT (OUTPATIENT)
Dept: ORTHOPEDIC SURGERY | Age: 50
End: 2020-02-24
Payer: COMMERCIAL

## 2020-02-24 VITALS
HEART RATE: 54 BPM | SYSTOLIC BLOOD PRESSURE: 116 MMHG | BODY MASS INDEX: 23.7 KG/M2 | HEIGHT: 69 IN | DIASTOLIC BLOOD PRESSURE: 73 MMHG | WEIGHT: 160 LBS

## 2020-02-24 PROCEDURE — G8427 DOCREV CUR MEDS BY ELIG CLIN: HCPCS | Performed by: ORTHOPAEDIC SURGERY

## 2020-02-24 PROCEDURE — 3017F COLORECTAL CA SCREEN DOC REV: CPT | Performed by: ORTHOPAEDIC SURGERY

## 2020-02-24 PROCEDURE — 99214 OFFICE O/P EST MOD 30 MIN: CPT | Performed by: ORTHOPAEDIC SURGERY

## 2020-02-24 PROCEDURE — G8484 FLU IMMUNIZE NO ADMIN: HCPCS | Performed by: ORTHOPAEDIC SURGERY

## 2020-02-24 PROCEDURE — 1036F TOBACCO NON-USER: CPT | Performed by: ORTHOPAEDIC SURGERY

## 2020-02-24 PROCEDURE — G8420 CALC BMI NORM PARAMETERS: HCPCS | Performed by: ORTHOPAEDIC SURGERY

## 2020-02-24 PROCEDURE — G9899 SCRN MAM PERF RSLTS DOC: HCPCS | Performed by: ORTHOPAEDIC SURGERY

## 2020-02-24 ASSESSMENT — ENCOUNTER SYMPTOMS
ALLERGIC/IMMUNOLOGIC NEGATIVE: 1
RESPIRATORY NEGATIVE: 1
EYES NEGATIVE: 1
GASTROINTESTINAL NEGATIVE: 1

## 2020-02-24 NOTE — PROGRESS NOTES
Subjective:      Patient ID: Yumiko Caruso is a 44 y.o. female. Chief complaint: Left knee pain    She is here for evaluation of left knee pain that started 3 weeks ago after hiking. It is anteromedial, sharp and worse with going up stairs, getting up from a seated position and when sitting for long periods of times. Has associated swelling. She denies numbness and tingling or any radiation. She is tried ibuprofen 400 mg twice daily and ice with gradual improvement of the swelling. She has had no other treatments besides an Epsom salt bath. She denies any direct or specific trauma. She does endorse some subjective mechanical symptoms. She states this feels very similar to her prior knee on the right which was a meniscus tear that failed conservative management. Past medical, surgical, social, family history were reviewed  Medications and allergies reviewed    Review of Systems   Constitutional: Negative. HENT: Negative. Eyes: Negative. Respiratory: Negative. Cardiovascular: Negative. Gastrointestinal: Negative. Endocrine: Negative. Genitourinary: Negative. Musculoskeletal: Positive for arthralgias, gait problem and joint swelling. Left knee pain    Skin: Negative. Allergic/Immunologic: Negative. Hematological: Negative. Psychiatric/Behavioral: Negative. /73   Pulse 54   Ht 5' 9\" (1.753 m)   Wt 160 lb (72.6 kg)   LMP 08/03/2016   BMI 23.63 kg/m²     Objective:   Physical Exam  Vitals signs reviewed. Constitutional:       Appearance: Normal appearance. She is normal weight. HENT:      Head: Normocephalic. Nose: Nose normal.      Mouth/Throat:      Mouth: Mucous membranes are moist.      Pharynx: Oropharynx is clear. Eyes:      Conjunctiva/sclera: Conjunctivae normal.   Cardiovascular:      Rate and Rhythm: Normal rate. Pulses: Normal pulses.    Pulmonary:      Effort: Pulmonary effort is normal.   Skin:     General: Skin is warm and dry.   Neurological:      General: No focal deficit present. Mental Status: She is alert and oriented to person, place, and time. Mental status is at baseline. Psychiatric:         Mood and Affect: Mood normal.         Behavior: Behavior normal.         Thought Content: Thought content normal.         Judgment: Judgment normal.       Knee Examination: Left     Inspection: Small effusion  Alignment: slight valgus  Palpation: There is patellofemoral crepitus, there is medial joint line tenderness. Range of Motion:  2-120   Strength: Motor is grossly intact  Stability: 2mm medial and 3mm lateral opening, neg lachman, neg anterior drawer, neg posterior drawer,   Special Tests:   + flexion/rotation test  Neuro: Sensation equal bilateral lower extremities   Gait: slight limp  Vascular: 2+ posterior tibialis pulse        Contralateral Knee Comparison Examination: Right     Inspection:  No edema or effusion  Alignment: slight valgus  Palpation: There is patellofemoral crepitus, there is no joint line tenderness. Range of Motion:  0-130  Strength: grossly intact  Stability: 2mm medial and 3mm lateral opening, neg lachman, neg anterior drawer, neg posterior drawer,   Neuro: Sensation equal bilateral lower extremities  Vascular: 2+ posterior tibialis pulse    Imaging: X-ray 3 views of the left knee including bilateral weightbearing PA flexion views, lateral and merchant views were obtained, reviewed and interpreted by me today in clinic and are significant for well-preserved joint spaces, good alignment on patellofemoral joint, no large osteophytes or significant degenerative changes. Overall unremarkable x-rays    Assessment:      Left knee meniscus tear        Plan:      Pertinent imaging was reviewed. The etiology, natural history, and treatment options for the disorder were discussed.   The roles of activity modifications, medications, injections, physical therapy, and surgical interventions were all described

## 2020-03-02 ENCOUNTER — OFFICE VISIT (OUTPATIENT)
Dept: ORTHOPEDIC SURGERY | Age: 50
End: 2020-03-02
Payer: COMMERCIAL

## 2020-03-02 VITALS
SYSTOLIC BLOOD PRESSURE: 118 MMHG | BODY MASS INDEX: 23.7 KG/M2 | WEIGHT: 160 LBS | HEIGHT: 69 IN | DIASTOLIC BLOOD PRESSURE: 72 MMHG | HEART RATE: 52 BPM

## 2020-03-02 PROCEDURE — G8484 FLU IMMUNIZE NO ADMIN: HCPCS | Performed by: ORTHOPAEDIC SURGERY

## 2020-03-02 PROCEDURE — G9899 SCRN MAM PERF RSLTS DOC: HCPCS | Performed by: ORTHOPAEDIC SURGERY

## 2020-03-02 PROCEDURE — 1036F TOBACCO NON-USER: CPT | Performed by: ORTHOPAEDIC SURGERY

## 2020-03-02 PROCEDURE — 3017F COLORECTAL CA SCREEN DOC REV: CPT | Performed by: ORTHOPAEDIC SURGERY

## 2020-03-02 PROCEDURE — 99212 OFFICE O/P EST SF 10 MIN: CPT | Performed by: ORTHOPAEDIC SURGERY

## 2020-03-02 PROCEDURE — G8427 DOCREV CUR MEDS BY ELIG CLIN: HCPCS | Performed by: ORTHOPAEDIC SURGERY

## 2020-03-02 PROCEDURE — G8420 CALC BMI NORM PARAMETERS: HCPCS | Performed by: ORTHOPAEDIC SURGERY

## 2020-03-02 NOTE — PROGRESS NOTES
recognition software. It has been proofread, but occasionally errors remain. Please disregard these errors. They will be corrected as they are noted.

## 2020-03-06 ENCOUNTER — HOSPITAL ENCOUNTER (OUTPATIENT)
Dept: PHYSICAL THERAPY | Age: 50
Setting detail: THERAPIES SERIES
Discharge: HOME | End: 2020-03-06
Payer: COMMERCIAL

## 2020-03-06 PROCEDURE — 9990000026 HC GAP EVAL

## 2020-03-06 NOTE — FLOWSHEET NOTE
Lower Extremity Daily Performance Training Note  Date:  3/6/2020    Patient Name:  Lpóez Alarcon    :  1970  MRN: [id number]  Restrictions/Precautions:   Medical/Treatment Diagnosis Information:   ·  Left meniscus tear, prehab  ·    Insurance/Certification information:   Gap     Physician Information:   Dr. Gina Georges      Pain level: 3-4/10     Visit Number: 1 Eval    Subjective: see eval    Objective:  Observation:   LEFS 42.5%    Exercises:  Exercise/Equipment Resistance/Repetitions Other comments   CARDIO/WARM-UP     Bike     Treadmill          STRETCHING     Hamstring [id number]    Hip Flexion     ITB     Groin     Quad     Incline Calf     Figure 4 [id number]    Cross body [id number]         SLR     Supine HEP    Prone HEP    Abduction HEP    Adduction HEP    clamm shell x10    Inverted clamm shell x10         SLR+     STRENGTHENING     Machines:           Knee Extension (90-30) Addressed how to SL/DL/iso with block          Knee Flexion Addressed SL/DL/iso          Leg Press  Addressed SL/DL/iso            -Single Leg             -Press with Adductor Squeeze             -Press with Band             -Bounding           Cable Column:          -Forward walkouts          -Reverse walkouts          -Forward lunge          -Reverse lunge          -HIP FOCUS             -Lateral walkouts             -Lateral step ups             -Standing Clam Shell step ups          Stool Scoots     Bridges With glute sueeze x10    Deadlifts     Triple Threat on ball     Quadruped Hip Extension     Stationary Lunges     Walking Lunges     Reverse Lunges     Multidirectional Lunges     Step Ups     Wall Sits [id number] addressed proper positioning    Lateral Band Walks     Monster Walks     Mini Squats         -Wobble Board               -Forward               -Lateral     Hip Progression                -Side-lying Hip ABD         -Side-lying Hip FLX knee to elbow         -Clamshell         -Rhythmic Stabs           SL Balance Addressed to multitask and add in movement with upperbody                   Other Therapeutic Activities:     Home Exercise Program: we address all exercises she has already been performing with her IHEP and made sure she had no questions about them     Patient Education:  On how to increase weights, reps, and holds        Assessment: patient had her questions all answered regarding propor positioning of exercises and given new HEP told to give in recovery day for strength and alternate with a stretching day as she feels she has not been stretching enough  [x] Patient tolerated treatment well [] Patient limited by fatigue  [] Patient limited by pain  [] Patient limited by other medical complications  [] Other:     Prognosis: [x] Good [] Fair  [] Poor    Patient Requires Follow-up: [x] Yes  [] No    Plan:   [] Continue per plan of care [] Alter current plan (see comments)  [x] Plan of care initiated [] Hold pending MD visit [] Discharge  Plan for Next Session:progression of exercises, possible lunges if knee is feeling stronger         Electronically signed by:  Owen ATC     Tx was performed by DIOGENES as a part of the [facility] program following PT's recommendations/guidance.

## 2020-03-06 NOTE — PLAN OF CARE
[address]  Phone: [phone]   Fax: [phone]                                                          Patient: Julio Catherine   : 1970   MRN: [id number]  Referring Physician:        Evaluation Date: 3/6/2020      Medical Diagnosis Information: Left knee pain, prehab/strengthening                                                  Precautions/ Contra-indications:   Latex Allergy:  [x]NO      []YES  Preferred Language for Healthcare:   [x]English       []other:    SUBJECTIVE: Patient stated complaint: she has been having pain in the left leg and is planning on having surgery in April. She wants to feel stronger per surgery so she can recovery and get back into running. She notes that stairs are the most bothersome but at times sitting for long periods and then standing also cause her pain. She wants to work out but needs some guidance. Relevant Medical History:pervious knee surgery R side.   Functional Disability Index:LEFS 42.5%  Relevant Meds:   Ibuprofen PRN  Current pain:  3-4/10    Easing factors: rest, sitting  Provocative factors: long periods of sitting to standing it catches, stairs up and down    Type: []Constant   [x]Intermittent  []Radiating [x]Localized []other:     Numbness/Tingling: NA    Functional Limitations/Impairments: [x]Sitting []Standing [x]Walking    [x]Squatting/bending  [x]Stairs           []ADL's  []Transfers [x]Sports/Recreation [x]Other:Running    Occupation/School: Massage Therapist    Living Status/Prior Level of Function: Independent with ADLs and IADLs,    OBJECTIVE:     Joint mobility:    [x]Normal    []Hypo   []Hyper        Flexibility L R Comment   Hamstring      Gastroc      ITB      Quad                ROM PROM AROM Overpressure Comment    L R L R L R    Flexion          Extension                                  Strength L R Comment   Quad      Hamstring Gastroc      Hip flexor      Hip ABD                    Orthopedic Special Tests:  Special Test Results/Comment   Meniscal Click    Crepitus    Flexion Test    Valgus Laxity    Varus Laxity    Lachmans    Drop Back    Homans            Girth L R   Mid Patella     Suprapatellar     5cm above     15cm above                              [x] Patient history, allergies, meds reviewed. Medical chart reviewed. See intake form. Review Of Systems (ROS):  [x]Performed Review of systems (Integumentary, CardioPulmonary, Neurological) by intake and observation. Intake form has been scanned into medical record. Patient has been instructed to contact their primary care physician regarding ROS issues if not already being addressed at this time.       Co-morbidities/Complexities (which will affect course of rehabilitation):   []None           Arthritic conditions   []Rheumatoid arthritis (M05.9)  [x]Osteoarthritis (M19.91)   Cardiovascular conditions   []Hypertension (I10)  []Hyperlipidemia (E78.5)  []Angina pectoris (I20)  []Atherosclerosis (I70)   Musculoskeletal conditions   []Disc pathology   []Congenital spine pathologies   []Prior surgical intervention  []Osteoporosis (M81.8)  []Osteopenia (M85.8)   Endocrine conditions   []Hypothyroid (E03.9)  []Hyperthyroid Gastrointestinal conditions   []Constipation (K00.08)   Metabolic conditions   []Morbid obesity (E66.01)  []Diabetes type 1(E10.65) or 2 (E11.65)   []Neuropathy (G60.9)     Pulmonary conditions    []Asthma (J45)  []Coughing   []COPD (J44.9)   Psychological Disorders  []Anxiety (F41.9)  []Depression (F32.9)   []Other:   [x]Other:   Previous knee injury R side       Barriers to/and or personal factors that will affect rehab potential:              []Age  []Sex              []Motivation/Lack of Motivation                        []Co-Morbidities              []Cognitive Function, education/learning barriers              []Environmental, home barriers OA   []Signs/symptoms consistent with internal derangement of knee/Hip   []Signs/symptoms consistent with functional hip weakness/NMR control      []Signs/symptoms consistent with tendinitis/tendinosis    []signs/symptoms consistent with pathology which may benefit from Dry needling      []other:      Prognosis/Rehab Potential:      []Excellent   [x]Good    []Fair   []Poor    Tolerance of evaluation/treatment:    []Excellent   [x]Good    []Fair   []Poor    PLAN  Frequency/Duration:  FUA 1-2x for progression prior to surgery  Interventions:  [x]  Therapeutic exercise including: strength training, ROM, for Lower extremity and core   [x]  NMR activation and proprioception for LE, Glutes and Core   [x]  Manual therapy as indicated for LE, Hip and spine to include: Dry Needling/IASTM, STM, PROM, Gr I-IV mobilizations, manipulation. [x] Modalities as needed that may include: thermal agents, E-stim, Biofeedback, US, iontophoresis as indicated  [x] Patient education on joint protection, postural re-education, activity modification, progression of HEP. HEP instruction: Pt was instructed in, and safely and correctly demonstrated home exercise program. Patient verbalized understanding of proper frequency of exercises. Copy of exercises was scanned into patient chart and can be fount in the media file. GOALS:  Patient stated goal: return to running and stronger pre surgery        Electronically signed by:  DIOGENES Bone   Tx was performed by DIOGENES as a part of the [facility] program following PT's recommendations/guidance.

## 2020-03-19 ENCOUNTER — TELEPHONE (OUTPATIENT)
Dept: ORTHOPEDIC SURGERY | Age: 50
End: 2020-03-19

## 2020-03-26 ENCOUNTER — APPOINTMENT (OUTPATIENT)
Dept: PHYSICAL THERAPY | Age: 50
End: 2020-03-26
Payer: COMMERCIAL

## 2020-04-01 ENCOUNTER — TELEPHONE (OUTPATIENT)
Dept: ORTHOPEDIC SURGERY | Age: 50
End: 2020-04-01

## 2020-04-29 ENCOUNTER — TELEPHONE (OUTPATIENT)
Dept: ORTHOPEDIC SURGERY | Age: 50
End: 2020-04-29

## 2020-04-30 NOTE — TELEPHONE ENCOUNTER
Called and spoke with patient. Explained that we are waiting on [facility] administration to let us begin scheduling surgeries. Patient is on the list to be called once we are allowed to schedule.

## 2020-05-05 ENCOUNTER — TELEPHONE (OUTPATIENT)
Dept: INTERNAL MEDICINE CLINIC | Age: 50
End: 2020-05-05

## 2020-05-05 NOTE — TELEPHONE ENCOUNTER
Pt is having Meniscus surgery within the next week with Dr. Leigh Aldana.  She needs a preop. Can she go to [facility] to have it. Please let her know.

## 2020-05-08 ENCOUNTER — OFFICE VISIT (OUTPATIENT)
Dept: ORTHOPEDIC SURGERY | Age: 50
End: 2020-05-08
Payer: COMMERCIAL

## 2020-05-08 VITALS — BODY MASS INDEX: 23.62 KG/M2 | WEIGHT: 165 LBS | TEMPERATURE: 96.8 F | HEIGHT: 70 IN

## 2020-05-08 PROCEDURE — 3017F COLORECTAL CA SCREEN DOC REV: CPT | Performed by: ORTHOPAEDIC SURGERY

## 2020-05-08 PROCEDURE — 1036F TOBACCO NON-USER: CPT | Performed by: ORTHOPAEDIC SURGERY

## 2020-05-08 PROCEDURE — G8420 CALC BMI NORM PARAMETERS: HCPCS | Performed by: ORTHOPAEDIC SURGERY

## 2020-05-08 PROCEDURE — G9899 SCRN MAM PERF RSLTS DOC: HCPCS | Performed by: ORTHOPAEDIC SURGERY

## 2020-05-08 PROCEDURE — G8427 DOCREV CUR MEDS BY ELIG CLIN: HCPCS | Performed by: ORTHOPAEDIC SURGERY

## 2020-05-08 PROCEDURE — 99213 OFFICE O/P EST LOW 20 MIN: CPT | Performed by: ORTHOPAEDIC SURGERY

## 2020-05-11 PROBLEM — S83.242A TEAR OF MEDIAL MENISCUS OF LEFT KNEE, CURRENT: Status: ACTIVE | Noted: 2020-05-11

## 2020-05-11 PROBLEM — Z01.818 PREOP EXAMINATION: Status: ACTIVE | Noted: 2020-05-11

## 2020-05-12 LAB — SARS-COV-2: NOT DETECTED

## 2020-05-14 ENCOUNTER — ANESTHESIA EVENT (OUTPATIENT)
Dept: OPERATING ROOM | Age: 50
End: 2020-05-14
Payer: COMMERCIAL

## 2020-05-14 RX ORDER — DOCUSATE SODIUM 100 MG/1
100 CAPSULE, LIQUID FILLED ORAL 2 TIMES DAILY
Qty: 60 CAPSULE | Refills: 0 | Status: SHIPPED | OUTPATIENT
Start: 2020-05-15 | End: 2021-04-19 | Stop reason: ALTCHOICE

## 2020-05-14 RX ORDER — PROMETHAZINE HYDROCHLORIDE 25 MG/1
25 TABLET ORAL EVERY 6 HOURS PRN
Qty: 30 TABLET | Refills: 0 | Status: SHIPPED | OUTPATIENT
Start: 2020-05-15 | End: 2020-05-21

## 2020-05-14 RX ORDER — HYDROCODONE BITARTRATE AND ACETAMINOPHEN 5; 325 MG/1; MG/1
1 TABLET ORAL EVERY 4 HOURS PRN
Qty: 20 TABLET | Refills: 0 | Status: SHIPPED | OUTPATIENT
Start: 2020-05-15 | End: 2020-05-20

## 2020-05-15 ENCOUNTER — HOSPITAL ENCOUNTER (OUTPATIENT)
Age: 50
Setting detail: OUTPATIENT SURGERY
Discharge: HOME | End: 2020-05-15
Attending: ORTHOPAEDIC SURGERY | Admitting: ORTHOPAEDIC SURGERY
Payer: COMMERCIAL

## 2020-05-15 ENCOUNTER — ANESTHESIA (OUTPATIENT)
Dept: OPERATING ROOM | Age: 50
End: 2020-05-15
Payer: COMMERCIAL

## 2020-05-15 VITALS
DIASTOLIC BLOOD PRESSURE: 64 MMHG | RESPIRATION RATE: 14 BRPM | TEMPERATURE: 97.4 F | HEIGHT: 69 IN | BODY MASS INDEX: 24.96 KG/M2 | WEIGHT: 168.54 LBS | OXYGEN SATURATION: 100 % | SYSTOLIC BLOOD PRESSURE: 103 MMHG | HEART RATE: 60 BPM

## 2020-05-15 VITALS
OXYGEN SATURATION: 100 % | DIASTOLIC BLOOD PRESSURE: 51 MMHG | RESPIRATION RATE: 4 BRPM | TEMPERATURE: 96.3 F | SYSTOLIC BLOOD PRESSURE: 95 MMHG

## 2020-05-15 PROCEDURE — 2709999900 HC NON-CHARGEABLE SUPPLY: Performed by: ORTHOPAEDIC SURGERY

## 2020-05-15 PROCEDURE — 2500000003 HC RX 250 WO HCPCS: Performed by: ORTHOPAEDIC SURGERY

## 2020-05-15 PROCEDURE — 6360000002 HC RX W HCPCS: Performed by: ORTHOPAEDIC SURGERY

## 2020-05-15 PROCEDURE — 6360000002 HC RX W HCPCS: Performed by: NURSE ANESTHETIST, CERTIFIED REGISTERED

## 2020-05-15 PROCEDURE — 2720000010 HC SURG SUPPLY STERILE: Performed by: ORTHOPAEDIC SURGERY

## 2020-05-15 PROCEDURE — 3700000000 HC ANESTHESIA ATTENDED CARE: Performed by: ORTHOPAEDIC SURGERY

## 2020-05-15 PROCEDURE — 7100000000 HC PACU RECOVERY - FIRST 15 MIN: Performed by: ORTHOPAEDIC SURGERY

## 2020-05-15 PROCEDURE — 2580000003 HC RX 258: Performed by: ORTHOPAEDIC SURGERY

## 2020-05-15 PROCEDURE — 2500000003 HC RX 250 WO HCPCS: Performed by: NURSE ANESTHETIST, CERTIFIED REGISTERED

## 2020-05-15 PROCEDURE — 7100000010 HC PHASE II RECOVERY - FIRST 15 MIN: Performed by: ORTHOPAEDIC SURGERY

## 2020-05-15 PROCEDURE — 3600000014 HC SURGERY LEVEL 4 ADDTL 15MIN: Performed by: ORTHOPAEDIC SURGERY

## 2020-05-15 PROCEDURE — 3600000004 HC SURGERY LEVEL 4 BASE: Performed by: ORTHOPAEDIC SURGERY

## 2020-05-15 PROCEDURE — 7100000001 HC PACU RECOVERY - ADDTL 15 MIN: Performed by: ORTHOPAEDIC SURGERY

## 2020-05-15 PROCEDURE — 2580000003 HC RX 258: Performed by: ANESTHESIOLOGY

## 2020-05-15 PROCEDURE — 2500000003 HC RX 250 WO HCPCS

## 2020-05-15 PROCEDURE — 3700000001 HC ADD 15 MINUTES (ANESTHESIA): Performed by: ORTHOPAEDIC SURGERY

## 2020-05-15 PROCEDURE — 7100000011 HC PHASE II RECOVERY - ADDTL 15 MIN: Performed by: ORTHOPAEDIC SURGERY

## 2020-05-15 RX ORDER — DEXAMETHASONE SODIUM PHOSPHATE 4 MG/ML
INJECTION, SOLUTION INTRA-ARTICULAR; INTRALESIONAL; INTRAMUSCULAR; INTRAVENOUS; SOFT TISSUE PRN
Status: DISCONTINUED | OUTPATIENT
Start: 2020-05-15 | End: 2020-05-15 | Stop reason: SDUPTHER

## 2020-05-15 RX ORDER — SODIUM CHLORIDE, SODIUM LACTATE, POTASSIUM CHLORIDE, CALCIUM CHLORIDE 600; 310; 30; 20 MG/100ML; MG/100ML; MG/100ML; MG/100ML
INJECTION, SOLUTION INTRAVENOUS CONTINUOUS PRN
Status: COMPLETED | OUTPATIENT
Start: 2020-05-15 | End: 2020-05-15

## 2020-05-15 RX ORDER — SODIUM CHLORIDE 9 MG/ML
INJECTION, SOLUTION INTRAVENOUS CONTINUOUS
Status: DISCONTINUED | OUTPATIENT
Start: 2020-05-15 | End: 2020-05-15 | Stop reason: HOSPADM

## 2020-05-15 RX ORDER — FENTANYL CITRATE 50 UG/ML
25 INJECTION, SOLUTION INTRAMUSCULAR; INTRAVENOUS EVERY 5 MIN PRN
Status: DISCONTINUED | OUTPATIENT
Start: 2020-05-15 | End: 2020-05-15 | Stop reason: HOSPADM

## 2020-05-15 RX ORDER — SODIUM CHLORIDE 0.9 % (FLUSH) 0.9 %
10 SYRINGE (ML) INJECTION PRN
Status: DISCONTINUED | OUTPATIENT
Start: 2020-05-15 | End: 2020-05-15 | Stop reason: HOSPADM

## 2020-05-15 RX ORDER — BUPIVACAINE HYDROCHLORIDE AND EPINEPHRINE 2.5; 5 MG/ML; UG/ML
INJECTION, SOLUTION EPIDURAL; INFILTRATION; INTRACAUDAL; PERINEURAL
Status: COMPLETED | OUTPATIENT
Start: 2020-05-15 | End: 2020-05-15

## 2020-05-15 RX ORDER — MIDAZOLAM HYDROCHLORIDE 1 MG/ML
INJECTION INTRAMUSCULAR; INTRAVENOUS PRN
Status: DISCONTINUED | OUTPATIENT
Start: 2020-05-15 | End: 2020-05-15 | Stop reason: SDUPTHER

## 2020-05-15 RX ORDER — SUCCINYLCHOLINE/SOD CL,ISO/PF 200MG/10ML
SYRINGE (ML) INTRAVENOUS PRN
Status: DISCONTINUED | OUTPATIENT
Start: 2020-05-15 | End: 2020-05-15 | Stop reason: SDUPTHER

## 2020-05-15 RX ORDER — ONDANSETRON 2 MG/ML
4 INJECTION INTRAMUSCULAR; INTRAVENOUS
Status: DISCONTINUED | OUTPATIENT
Start: 2020-05-15 | End: 2020-05-15 | Stop reason: HOSPADM

## 2020-05-15 RX ORDER — ONDANSETRON 2 MG/ML
INJECTION INTRAMUSCULAR; INTRAVENOUS PRN
Status: DISCONTINUED | OUTPATIENT
Start: 2020-05-15 | End: 2020-05-15 | Stop reason: SDUPTHER

## 2020-05-15 RX ORDER — LIDOCAINE HYDROCHLORIDE 20 MG/ML
INJECTION, SOLUTION EPIDURAL; INFILTRATION; INTRACAUDAL; PERINEURAL PRN
Status: DISCONTINUED | OUTPATIENT
Start: 2020-05-15 | End: 2020-05-15 | Stop reason: SDUPTHER

## 2020-05-15 RX ORDER — ROCURONIUM BROMIDE 10 MG/ML
INJECTION, SOLUTION INTRAVENOUS PRN
Status: DISCONTINUED | OUTPATIENT
Start: 2020-05-15 | End: 2020-05-15 | Stop reason: SDUPTHER

## 2020-05-15 RX ORDER — PROPOFOL 10 MG/ML
INJECTION, EMULSION INTRAVENOUS PRN
Status: DISCONTINUED | OUTPATIENT
Start: 2020-05-15 | End: 2020-05-15 | Stop reason: SDUPTHER

## 2020-05-15 RX ORDER — SODIUM CHLORIDE 0.9 % (FLUSH) 0.9 %
10 SYRINGE (ML) INJECTION EVERY 12 HOURS SCHEDULED
Status: DISCONTINUED | OUTPATIENT
Start: 2020-05-15 | End: 2020-05-15 | Stop reason: HOSPADM

## 2020-05-15 RX ORDER — FENTANYL CITRATE 50 UG/ML
INJECTION, SOLUTION INTRAMUSCULAR; INTRAVENOUS PRN
Status: DISCONTINUED | OUTPATIENT
Start: 2020-05-15 | End: 2020-05-15 | Stop reason: SDUPTHER

## 2020-05-15 RX ADMIN — FENTANYL CITRATE 50 MCG: 50 INJECTION INTRAMUSCULAR; INTRAVENOUS at 09:46

## 2020-05-15 RX ADMIN — ROCURONIUM BROMIDE 5 MG: 10 INJECTION INTRAVENOUS at 09:46

## 2020-05-15 RX ADMIN — MIDAZOLAM 2 MG: 1 INJECTION INTRAMUSCULAR; INTRAVENOUS at 09:39

## 2020-05-15 RX ADMIN — PROPOFOL 150 MG: 10 INJECTION, EMULSION INTRAVENOUS at 09:46

## 2020-05-15 RX ADMIN — LIDOCAINE HYDROCHLORIDE 100 MG: 20 INJECTION, SOLUTION EPIDURAL; INFILTRATION; INTRACAUDAL; PERINEURAL at 09:46

## 2020-05-15 RX ADMIN — Medication 140 MG: at 09:46

## 2020-05-15 RX ADMIN — ONDANSETRON 4 MG: 2 INJECTION INTRAMUSCULAR; INTRAVENOUS at 09:39

## 2020-05-15 RX ADMIN — SODIUM CHLORIDE: 9 INJECTION, SOLUTION INTRAVENOUS at 09:31

## 2020-05-15 RX ADMIN — CEFAZOLIN SODIUM 2 G: 10 INJECTION, POWDER, FOR SOLUTION INTRAVENOUS at 09:41

## 2020-05-15 RX ADMIN — FENTANYL CITRATE 25 MCG: 50 INJECTION INTRAMUSCULAR; INTRAVENOUS at 10:20

## 2020-05-15 RX ADMIN — DEXAMETHASONE SODIUM PHOSPHATE 10 MG: 4 INJECTION, SOLUTION INTRAMUSCULAR; INTRAVENOUS at 09:57

## 2020-05-15 RX ADMIN — FENTANYL CITRATE 25 MCG: 50 INJECTION INTRAMUSCULAR; INTRAVENOUS at 10:13

## 2020-05-15 RX ADMIN — FAMOTIDINE 20 MG: 10 INJECTION, SOLUTION INTRAVENOUS at 09:39

## 2020-05-15 RX ADMIN — SUGAMMADEX 200 MG: 100 INJECTION, SOLUTION INTRAVENOUS at 10:18

## 2020-05-15 ASSESSMENT — PAIN SCALES - GENERAL
PAINLEVEL_OUTOF10: 0

## 2020-05-15 ASSESSMENT — PULMONARY FUNCTION TESTS
PIF_VALUE: 14
PIF_VALUE: 14
PIF_VALUE: 2
PIF_VALUE: 0
PIF_VALUE: 6
PIF_VALUE: 13
PIF_VALUE: 14
PIF_VALUE: 14
PIF_VALUE: 2
PIF_VALUE: 14
PIF_VALUE: 1
PIF_VALUE: 18
PIF_VALUE: 2
PIF_VALUE: 14
PIF_VALUE: 13
PIF_VALUE: 15
PIF_VALUE: 14
PIF_VALUE: 15
PIF_VALUE: 14
PIF_VALUE: 0
PIF_VALUE: 14
PIF_VALUE: 14
PIF_VALUE: 0
PIF_VALUE: 14
PIF_VALUE: 7
PIF_VALUE: 1
PIF_VALUE: 0
PIF_VALUE: 14
PIF_VALUE: 5
PIF_VALUE: 15
PIF_VALUE: 8
PIF_VALUE: 14
PIF_VALUE: 3
PIF_VALUE: 14
PIF_VALUE: 5
PIF_VALUE: 1

## 2020-05-15 ASSESSMENT — ENCOUNTER SYMPTOMS: SHORTNESS OF BREATH: 0

## 2020-05-15 ASSESSMENT — PAIN DESCRIPTION - DESCRIPTORS: DESCRIPTORS: ACHING

## 2020-05-15 ASSESSMENT — PAIN - FUNCTIONAL ASSESSMENT: PAIN_FUNCTIONAL_ASSESSMENT: 0-10

## 2020-05-15 NOTE — H&P
Pt seen and examined, left knee marked, no interval changes, Covid-. All questions answered, ready for surgery.

## 2020-05-15 NOTE — ANESTHESIA PRE PROCEDURE
[facility] Department of Anesthesiology  Pre-Anesthesia Evaluation/Consultation       Name:  Gamal Chacon  : 1970  Age:  44 y.o. MRN:  [id number]  Date: 5/15/2020           Surgeon: Surgeon(s):  Oneil Arana MD    Procedure: Procedure(s):  RIGHT KNEE ARTHROSCOPY MEDIAL MENISCECTOMY     Allergies   Allergen Reactions    Codeine Other (See Comments)     Drops heart rate  As child bp dropped     Patient Active Problem List   Diagnosis    Tear of medial meniscus of left knee, current    Preop examination     Past Medical History:   Diagnosis Date    Hyperlipidemia     Retention of urine     only after bladder suspension    Torn meniscus     right knee with mild OA     Past Surgical History:   Procedure Laterality Date    BLADDER SUSPENSION      BUNIONECTOMY Right 1984    KNEE ARTHROSCOPY Right 10/23/2019    RIGHT KNEE ARTHROSCOPY MEDIAL MENISCECTOMY performed by Oneil Arana MD at [address] [place] [address] Left     RECTOCELE REPAIR      WISDOM TOOTH EXTRACTION       Social History     Tobacco Use    Smoking status: Never Smoker    Smokeless tobacco: Never Used   Substance Use Topics    Alcohol use: Yes     Frequency: Monthly or less     Comment: rare    Drug use: Never     Medications  No current facility-administered medications on file prior to encounter.       Current Outpatient Medications on File Prior to Encounter   Medication Sig Dispense Refill    NONFORMULARY Take 1,125 mg by mouth Indications: rezume      NONFORMULARY Take 150 mg by mouth daily Indications: SYTRINOL      Omega-3 Fatty Acids (FISH OIL PO) Take by mouth      Nutritional Supplements (RHEUMATOID ARTHRITIS SUPPORT PO) Take by mouth       Current Facility-Administered Medications   Medication Dose Route Frequency Provider Last Rate Last Dose    0.9 % sodium chloride infusion   Intravenous Continuous Zelalem Plata MD        sodium chloride flush 0.9 % injection 10 mL  10 mL Intravenous 2 times per day Kenn Izquierdo MD        sodium chloride flush 0.9 % injection 10 mL  10 mL Intravenous PRN Kenn Izquierdo MD        ceFAZolin (ANCEF) 2 g in dextrose 5 % 100 mL IVPB  2 g Intravenous Once Ramin Lamar MD         Vital Signs (Current)   Vitals:    20 1414 05/15/20 0735 05/15/20 0738   BP:   (!) 10753   Pulse:  62    Resp:  16    Temp:  97.2 °F (36.2 °C)    TempSrc:  Temporal    SpO2:  99%    Weight: 169 lb (76.7 kg) 168 lb 8.7 oz (76.5 kg)    Height: 5' 9\" (1.753 m) 5' 9\" (1.753 m)                                           BP Readings from Last 3 Encounters:   05/15/20 (!) 107/53   20 112/78   20 118/72     Vital Signs Statistics (for past 48 hrs)     Temp  Av.2 °F (36.2 °C)  Min: 97.2 °F (36.2 °C)   Min taken time: 05/15/20 0735  Max: 97.2 °F (36.2 °C)   Max taken time: 05/15/20 0735  Pulse  Av  Min: 58   Min taken time: 05/15/20 0735  Max: 58   Max taken time: 05/15/20 0735  Resp  Av  Min: 12   Min taken time: 05/15/20 0735  Max: 12   Max taken time: 05/15/20 0735  BP  Min: 107/53   Min taken time: 05/15/20 0738  Max: 107/53   Max taken time: 05/15/20 0738  SpO2  Av %  Min: 99 %   Min taken time: 05/15/20 0735  Max: 99 %   Max taken time: 05/15/20 0735  BP Readings from Last 3 Encounters:   05/15/20 (!) 107/53   20 112/78   20 118/72       BMI  Body mass index is 24.89 kg/m². Estimated body mass index is 24.89 kg/m² as calculated from the following:    Height as of this encounter: 5' 9\" (1.753 m). Weight as of this encounter: 168 lb 8.7 oz (76.5 kg).     CBC No results found for: WBC, RBC, HGB, HCT, MCV, RDW, PLT  CMP  No results found for: NA, K, CL, CO2, BUN, CREATININE, GFRAA, AGRATIO, LABGLOM, GLUCOSE, PROT, CALCIUM, BILITOT, ALKPHOS, AST, ALT  BMP  No results found for: NA, K, CL, CO2, BUN, CREATININE, CALCIUM, GFRAA, LABGLOM, GLUCOSE  POCGlucose  No results for input(s): GLUCOSE in the last 72 hours. Coags  No results found for: PROTIME, INR, APTT  HCG (If Applicable)   Lab Results   Component Value Date    PREGTESTUR Negative 08/11/2016      ABGs No results found for: PHART, PO2ART, AYG1RDO, GUR5JAT, BEART, D0HUKOMB   Type & Screen (If Applicable)  No results found for: LABABO, LABRH                         BMI: Wt Readings from Last 3 Encounters:       NPO Status:   Date of last liquid consumption: 05/14/20   Time of last liquid consumption: 2200   Date of last solid food consumption: 05/14/20      Time of last solid consumption: 2000       Anesthesia Evaluation  Patient summary reviewed   history of anesthetic complications: PONV. Airway: Mallampati: II  TM distance: >3 FB   Neck ROM: full  Mouth opening: > = 3 FB Dental:      Comment: No loose teeth    Pulmonary:normal exam        (-) pneumonia, COPD, asthma, shortness of breath, recent URI and sleep apnea                           Cardiovascular:    (+) hyperlipidemia    (-) pacemaker, hypertension, valvular problems/murmurs, past MI, CABG/stent, dysrhythmias and  angina                Neuro/Psych:      (-) seizures, TIA and CVA           GI/Hepatic/Renal:        (-) GERD, PUD, hepatitis, liver disease, no renal disease, bowel prep and no morbid obesity      ROS comment: IBS. Endo/Other:    (+) : arthritis: OA., .    (-) diabetes mellitus, hypothyroidism               Abdominal:           Vascular:     - PVD, DVT and PE. Anesthesia Plan      general     ASA 2       Induction: intravenous. MIPS: Prophylactic antiemetics administered. Anesthetic plan and risks discussed with patient. Plan discussed with CRNA. This pre-anesthesia assessment may be used as a history and physical.    DOS STAFF ADDENDUM:    Pt seen and examined, chart reviewed (including anesthesia, drug and allergy history). No interval changes to history and physical examination.   Anesthetic plan, risks,

## 2020-05-18 ENCOUNTER — HOSPITAL ENCOUNTER (OUTPATIENT)
Dept: PHYSICAL THERAPY | Age: 50
Setting detail: THERAPIES SERIES
Discharge: HOME | End: 2020-05-18
Payer: COMMERCIAL

## 2020-05-18 ENCOUNTER — OFFICE VISIT (OUTPATIENT)
Dept: ORTHOPEDIC SURGERY | Age: 50
End: 2020-05-18

## 2020-05-18 VITALS — HEIGHT: 70 IN | WEIGHT: 165 LBS | RESPIRATION RATE: 12 BRPM | BODY MASS INDEX: 23.62 KG/M2

## 2020-05-18 PROCEDURE — 99024 POSTOP FOLLOW-UP VISIT: CPT | Performed by: ORTHOPAEDIC SURGERY

## 2020-05-18 PROCEDURE — 97161 PT EVAL LOW COMPLEX 20 MIN: CPT | Performed by: PHYSICAL THERAPIST

## 2020-05-18 PROCEDURE — 97110 THERAPEUTIC EXERCISES: CPT | Performed by: PHYSICAL THERAPIST

## 2020-05-18 PROCEDURE — 97112 NEUROMUSCULAR REEDUCATION: CPT | Performed by: PHYSICAL THERAPIST

## 2020-05-18 PROCEDURE — 97016 VASOPNEUMATIC DEVICE THERAPY: CPT | Performed by: PHYSICAL THERAPIST

## 2020-05-18 NOTE — PLAN OF CARE
[facility], [address] [place], [address]     Phone: [phone]   Fax: [phone]                                                           Physical Therapy Certification    Dear Referring Practitioner: Christiano Forrester,    We had the pleasure of evaluating the following patient for physical therapy services at [address] [place] [address]. A summary of our findings can be found in the initial assessment below. This includes our plan of care. If you have any questions or concerns regarding these findings, please do not hesitate to contact me at the office phone number checked above. Thank you for the referral.       Physician Signature:_______________________________Date:__________________  By signing above (or electronic signature), therapists plan is approved by physician      Patient: Ana Reynoso   : 1970   MRN: [id number]  Referring Physician: Referring Practitioner: Abiodun      Evaluation Date: 2020      Medical Diagnosis Information:  Diagnosis: A60.671I (ICD-10-CM) - Acute medial meniscus tear of left knee, initial encounter   Treatment Diagnosis: M25.562 (ICD-10-CM) - Acute pain of left knee                                           Precautions/ Contra-indications: none  Latex Allergy:  [x]NO      []YES  Preferred Language for Healthcare:   []English       []other:    SUBJECTIVE: Patient stated complaint: taken from MD intake; The patient is a 73-year-old female who is an avid runner  who injured her left knee. Surgery was initially postponed due to  coronavirus, and she remained symptomatic with medial compartment  discomfort and intermittent swelling. She understood the risks,  benefits, and alternatives of the operation, and she was eager to  proceed. Current c/o: She is feeling very good. Almost no pain.      Relevant Medical History:+ see enclosed  Functional Scale: LEFS=  55% Date: 20  Pain Scale: 2/10  Easing factors: NA surgery yesterday  Provocative factors: NA surgery yesterday    Type: []Constant   [x]Intermittent  []Radiating []Localized []other:     Numbness/Tingling: none    Functional Limitations/Impairments: []Sitting [x]Standing [x]Walking    [x]Squatting/bending  [x]Stairs           [x]ADL's  [x]Transfers [x]Sports/Recreation []Other:    Occupation/School:Spiritual leader    Living Status/Prior Level of Function: Independent with ADLs   (insert highest prior level of function)    OBJECTIVE:     Joint mobility:    [x]Normal    []Hypo   []Hyper    Palpation: slight to medial joint line    Functional Mobility/Transfers: I    Posture: Bilateral crutches    Bandages/Dressings/Incisions: Pt's dressings were removed. No S&S of infection were noted. Pt was ed on S&S of infection, DVT, wound care, and showering. Gait: (include devices/WB status) WBAT; recommended 50% until we see each other again. Flexibility L R Comment   Hamstring good     Gastroc good     ITB      Quad                ROM PROM AROM Overpressure Comment    L R L R L R    Flexion 126         Extension 0                               deferred  Strength L R Comment   Quad      Hamstring      Gastroc      Hip flexor      Hip ABD                    Orthopedic Special Tests: deferred  Special Test Results/Comment   Meniscal Click    Crepitus    Flexion Test    Valgus Laxity    Varus Laxity    Lachmans    Drop Back    Homans          No visible swelling noted.    Girth L R   Mid Patella     Suprapatellar     5cm above     15cm above       Wells Clinical Decision Rule for DVT    Date:  Clinical Presentation  Possible Score  Clients Score    Active cancer (within 6 months of diagnosis or receiving palliative care)  1     Paralysis, paresis, or recent immobilization of lower extremity  1     Bedridden for more than 3 days or major surgery in the last 4 weeks  1     Localized tenderness in the center of the posterior calf, the popliteal space, or along the femoral vein in the anterior thigh/groin  1     Entire lower extremity swelling  1     Unilateral calf swelling (more than 3 cm larger than uninvolved side)  1     Unilateral pitting edema  1     Collateral superficial veins (nonvaricose)  1     An alternative diagnosis is as likely (or more likely) than DVT (e.g., cellulitis, postoperative swelling, calf strain)  -2  -2   Total Points   -2     Key  · -2 to 0 Low probability of DVT 3% (95% confidence interval [CI] 1.7%-5.9%)  · 1 to 2 Moderate probability of DVT 17% (95% confidence interval [CI] 12%-23%)  · 3 or more High probability of DVT 75% (95% confidence interval [CI] 63%-84%)    Medical consultation is advised in the presence of low probability  Medical referral is required with moderate or high score. Michael PS, Andrew LUONG, Leeann J, et al: Value of assessment of pretest probability of deep-vein thrombosis in clinical management, Lancet 957:4887-7077, 1997. [x] Patient history, allergies, meds reviewed. Medical chart reviewed. See intake form. Review Of Systems (ROS):  [x]Performed Review of systems (Integumentary, CardioPulmonary, Neurological) by intake and observation. Intake form has been scanned into medical record. Patient has been instructed to contact their primary care physician regarding ROS issues if not already being addressed at this time.       Co-morbidities/Complexities (which will affect course of rehabilitation):   []None           Arthritic conditions   [x]Rheumatoid arthritis (M05.9)  []Osteoarthritis (M19.91)   Cardiovascular conditions   []Hypertension (I10)  []Hyperlipidemia (E78.5)  []Angina pectoris (I20)  []Atherosclerosis (I70)   Musculoskeletal conditions   []Disc pathology   []Congenital spine pathologies   []Prior surgical intervention  []Osteoporosis (M81.8)  []Osteopenia (M85.8)   Endocrine conditions   []Hypothyroid (E03.9)  []Hyperthyroid Gastrointestinal conditions   []Constipation (L98.86)   Metabolic conditions   []Morbid obesity (E66.01)  []Diabetes type 1(E10.65) or 2 (E11.65)   []Neuropathy (G60.9)     Pulmonary conditions   []Asthma (J45)  []Coughing   []COPD (J44.9)   Psychological Disorders  []Anxiety (F41.9)  []Depression (F32.9)   []Other:   []Other:          Barriers to/and or personal factors that will affect rehab potential:              []Age  []Sex              []Motivation/Lack of Motivation                        []Co-Morbidities              []Cognitive Function, education/learning barriers              []Environmental, home barriers              []profession/work barriers  []past PT/medical experience  [x]other: patient wants to progress too quickly   Justification: The patient requires skilled PT to restore ROM, mmt and functional mobility to PLOF. Falls Risk Assessment (30 days):   [x]Falls Risk assessed and no intervention required.   [] Falls Risk assessed and Patient requires intervention due to being higher risk   TUG score (>12s at risk):     [] Falls education provided, including           ASSESSMENT:   Functional Impairments:     [x]Noted lumbar/proximal hip/LE joint hypomobility   [x]Decreased LE functional ROM   [x]Decreased core/proximal hip strength and neuromuscular control   [x]Decreased LE functional strength   [x]Reduced balance/proprioceptive control   []other:      Functional Activity Limitations (from functional questionnaire and intake)   [x]Reduced ability to tolerate prolonged functional positions   [x]Reduced ability or difficulty with changes of positions or transfers between positions   [x]Reduced ability to maintain good posture and demonstrate good body mechanics with sitting, bending, and lifting   [x]Reduced ability to sleep   [x]Reduced ability or tolerance with driving and/or computer work   [x]Reduced ability to perform lifting, carrying tasks   [x]Reduced ability to squat   []Reduced ability to forward

## 2020-05-18 NOTE — FLOWSHEET NOTE
Hip ABD      Hip Ext      Hip IR      Hip ER          Girth L R   Mid Patella     Suprapatellar     5cm above     15cm above         Orthopedic Special Tests:  Special Test Results/Comment   Meniscal Click    Crepitus    Flexion Test    Valgus Laxity    Varus Laxity    Lachmans    Drop Back    Oskar [place]          RESTRICTIONS/PRECAUTIONS:     Exercises/Interventions:     Therapeutic Ex (63744) Position Sets/sec Reps Notes/CUES   Stretching       Hamstring Long sitting 30 5    Hip Flexion       ITB       Groin       Quad       Inclined Calf- Gastroc       Inclined Calf-Soleus       Towel pull- Calf  30 5    Piriformis       Figure 4 push        Hip Adductor              Isometrics       Quad Sets  10 10    Glut Sets       Hip Abduction       Hamstring       Hip Flexion       Hip Adduction- BS              SLR       Supine Flexion  3 10 1#   Prone Extension       SL Adduction  3 10 2#   SL Abduction  3 10 2#   SLR +        SLR ABC's       Clams       Reverse Clams              ROM       Sheet Pulls  10 10    Wall Slides       Edge of Bed       ERMI - Flexionator       ERMI- Extensionator       Weighted Hangs       Ankle Pumps  3' 1    E-Z Stretch              PRE's       Leg Press- Range: 70 - 5        Leg Extension- Range: 90 - 40        Leg Curl- Range: 0 -90              CCTKE- Cable Column       CCTKE- Prone on table              CKC       Calf Raises       Wall Sits       Mini Squats       Lateral Band Walks                                                 Scifit Bike Time:   Level:   Program:   Seat:       AD- Bike Time:   RPM's:   Seat:      Alter G Treadmill Time:      Short Size:    Treadmill       Elliptical              NMR re-education (03489)    CUES NEEDED   Biodex-balance       Single leg stance       Plyoback       Rocker Board       SL Deadlifts              Planks- front       Planks- Side                                                        Therapeutic Activity (45784)       Step Ups allow for proper functional mobility as indicated by patients functional deficits. [] Progressing: [] Met: [] Not Met: [] Adjusted  4. Patient will return to jogging without increased symptoms or restriction. 8 weeks  [] Progressing: [] Met: [] Not Met: [] Adjusted         Overall Progression Towards Functional goals/ Treatment Progress Update:  [] Patient is progressing as expected towards functional goals listed. [] Progression is slowed due to complexities/Impairments listed. [] Progression has been slowed due to co-morbidities. [x] Plan just implemented, too soon to assess goals progression <30days   [] Goals require adjustment due to lack of progress  [] Patient is not progressing as expected and requires additional follow up with physician  [] Other    Prognosis for POC: [x] Good [] Fair  [] Poor      Patient requires continued skilled intervention: [x] Yes  [] No    Treatment/Activity Tolerance:  [x] Patient able to complete treatment  [] Patient limited by fatigue  [] Patient limited by pain     [] Patient limited by other medical complications  [] Other:     Return to Play: (if applicable)   []  Stage 1: Intro to Strength   []  Stage 2: Return to Run and Strength   []  Stage 3: Return to Jump and Strength   []  Stage 4: Dynamic Strength and Agility   []  Stage 5: Sport Specific Training     []  Ready to Return to Play, Meets All Above Stages   []  Not Ready for Return to Sports   Comments:                            PLAN: See eval  [] Continue per plan of care [] Alter current plan (see comments above)  [x] Plan of care initiated [] Hold pending MD visit [] Discharge      Electronically signed by:  Yue Mckeon PT      Note: If patient does not return for scheduled/ recommended follow up visits, this note will serve as a discharge from care along with most recent update on progress.

## 2020-05-20 ENCOUNTER — HOSPITAL ENCOUNTER (OUTPATIENT)
Dept: PHYSICAL THERAPY | Age: 50
Setting detail: THERAPIES SERIES
Discharge: HOME | End: 2020-05-20
Payer: COMMERCIAL

## 2020-05-20 PROCEDURE — 97016 VASOPNEUMATIC DEVICE THERAPY: CPT | Performed by: PHYSICAL THERAPIST

## 2020-05-20 PROCEDURE — 97112 NEUROMUSCULAR REEDUCATION: CPT | Performed by: PHYSICAL THERAPIST

## 2020-05-20 PROCEDURE — 97110 THERAPEUTIC EXERCISES: CPT | Performed by: PHYSICAL THERAPIST

## 2020-05-20 PROCEDURE — 97530 THERAPEUTIC ACTIVITIES: CPT | Performed by: PHYSICAL THERAPIST

## 2020-05-20 NOTE — FLOWSHEET NOTE
Plyoback       Rocker Board        [employer]              Planks- front       Planks- Side                                                        Therapeutic Activity (35420)       Step Ups       Step up and overs       Lateral Step downs       Stool Scoots                     Manual Intervention (53779)       Patellar Glides       Medial        Lateral        Superior       Inferior                           Therapeutic Exercise and NMR EXR  [x] (22930) Provided verbal/tactile cueing for activities related to strengthening, flexibility, endurance, ROM for improvements in LE, proximal hip, and core control with self care, mobility, lifting, ambulation.  [] (68286) Provided verbal/tactile cueing for activities related to improving balance, coordination, kinesthetic sense, posture, motor skill, proprioception  to assist with LE, proximal hip, and core control in self care, mobility, lifting, ambulation and eccentric single leg control.      NMR and Therapeutic Activities:    [x] (58375 or 35788) Provided verbal/tactile cueing for activities related to improving balance, coordination, kinesthetic sense, posture, motor skill, proprioception and motor activation to allow for proper function of core, proximal hip and LE with self care and ADLs  [] (74330) Gait Re-education- Provided training and instruction to the patient for proper LE, core and proximal hip recruitment and positioning and eccentric body weight control with ambulation re-education including up and down stairs     Home Exercise Program:    [x] (12678) Reviewed/Progressed HEP activities related to strengthening, flexibility, endurance, ROM of core, proximal hip and LE for functional self-care, mobility, lifting and ambulation/stair navigation   [] (69109)Reviewed/Progressed HEP activities related to improving balance, coordination, kinesthetic sense, posture, motor skill, proprioception of core, proximal hip and LE for self care, mobility, lifting, and serve as a discharge from care along with most recent update on progress.

## 2020-05-26 ENCOUNTER — HOSPITAL ENCOUNTER (OUTPATIENT)
Dept: PHYSICAL THERAPY | Age: 50
Setting detail: THERAPIES SERIES
Discharge: HOME | End: 2020-05-26
Payer: COMMERCIAL

## 2020-05-26 ENCOUNTER — OFFICE VISIT (OUTPATIENT)
Dept: ORTHOPEDIC SURGERY | Age: 50
End: 2020-05-26

## 2020-05-26 VITALS — BODY MASS INDEX: 23.62 KG/M2 | HEIGHT: 70 IN | WEIGHT: 165 LBS | RESPIRATION RATE: 12 BRPM

## 2020-05-26 PROCEDURE — 97016 VASOPNEUMATIC DEVICE THERAPY: CPT | Performed by: PHYSICAL THERAPIST

## 2020-05-26 PROCEDURE — 99024 POSTOP FOLLOW-UP VISIT: CPT | Performed by: ORTHOPAEDIC SURGERY

## 2020-05-26 PROCEDURE — 97110 THERAPEUTIC EXERCISES: CPT | Performed by: PHYSICAL THERAPIST

## 2020-05-26 PROCEDURE — 97112 NEUROMUSCULAR REEDUCATION: CPT | Performed by: PHYSICAL THERAPIST

## 2020-05-26 PROCEDURE — 97530 THERAPEUTIC ACTIVITIES: CPT | Performed by: PHYSICAL THERAPIST

## 2020-05-26 NOTE — FLOWSHEET NOTE
[facility]                                                         Date:  2020    Patient Name:  Mora Arellano    :  1970  MRN: [id number]  Restrictions/Precautions:    Medical/Treatment Diagnosis Information:  · Diagnosis: Z94.232K (ICD-10-CM) - Acute medial meniscus tear of left knee, initial encounter  · Treatment Diagnosis: M25.562 (ICD-10-CM) - Acute pain of left knee  Insurance/Certification information:  PT Insurance Information: [address]  Physician Information:  Referring Practitioner: Betzaida Flores  Has the plan of care been signed (Y/N):        []  Yes  [x]  No     Date of Patient follow up with Physician: 20      Is this a Progress Report:     []  Yes  [x]  No        If Yes:  Date Range for reporting period:  Beginning 2020  Ending    Progress report will be due (10 Rx or 30 days whichever is less): 80       Recertification will be due (POC Duration  / 90 days whichever is less): 20     Precautions/ Contra-indications:     C-SSRS Triggered by Intake questionnaire (Past 2 wk assessment):   [x] No, Questionnaire did not trigger screening.   [] Yes, Patient intake triggered further evaluation      [] C-SSRS Screening completed  [] PCP notified via Plan of Care  [] Emergency services notified     Visit # Insurance Allowable Auth Required   3 30 []  Yes [x]  No        Functional Scale: LEFS= 55%   Date assessed:  20        Pain level:  0/10     SUBJECTIVE:  Pt is to return to work on Wednesday. She doesn't feel she needs the crutch any more. She feels great.       OBJECTIVE: 2020    Observation:    Test measurements:      Flexibility L R Comment   Hamstring      Gastroc      ITB      Quad                ROM PROM AROM Overpressure Comment    L R L R L R    Knee Flexion 145 sheet pulls         Knee Extension   0       Hip Flexion          Hip Abd as indicated by patients functional deficits. [] Progressing: [] Met: [] Not Met: [] Adjusted  3. Patient will demonstrate an increase in strength of hip flex, ABD, and knee flex/ext to LLE 5 to allow for proper functional mobility as indicated by patients functional deficits. [] Progressing: [] Met: [] Not Met: [] Adjusted  4. Patient will return to jogging without increased symptoms or restriction. 8 weeks  [] Progressing: [] Met: [] Not Met: [] Adjusted         Overall Progression Towards Functional goals/ Treatment Progress Update:  [] Patient is progressing as expected towards functional goals listed. [] Progression is slowed due to complexities/Impairments listed. [] Progression has been slowed due to co-morbidities. [x] Plan just implemented, too soon to assess goals progression <30days   [] Goals require adjustment due to lack of progress  [] Patient is not progressing as expected and requires additional follow up with physician  [] Other    Prognosis for POC: [x] Good [] Fair  [] Poor      Patient requires continued skilled intervention: [x] Yes  [] No    Treatment/Activity Tolerance:  [x] Patient able to complete treatment  [] Patient limited by fatigue  [] Patient limited by pain     [] Patient limited by other medical complications  [] Other: Pt lin tx well. She demo full and normal ROM. She is making good progress overall. We did discuss that she can wean off the crutch. She is to use it as she needs or if in at risk situations. She understands this. She would continue to benefit from skilled PT to improve strength, ROM, and function.     Return to Play: (if applicable)   []  Stage 1: Intro to Strength   []  Stage 2: Return to Run and Strength   []  Stage 3: Return to Jump and Strength   []  Stage 4: Dynamic Strength and Agility   []  Stage 5: Sport Specific Training     []  Ready to Return to Play, Meets All Above Stages   []  Not Ready for Return to Sports   Comments:

## 2020-05-28 ENCOUNTER — HOSPITAL ENCOUNTER (OUTPATIENT)
Dept: PHYSICAL THERAPY | Age: 50
Setting detail: THERAPIES SERIES
Discharge: HOME | End: 2020-05-28
Payer: COMMERCIAL

## 2020-05-28 PROCEDURE — 97530 THERAPEUTIC ACTIVITIES: CPT | Performed by: PHYSICAL THERAPIST

## 2020-05-28 PROCEDURE — 97112 NEUROMUSCULAR REEDUCATION: CPT | Performed by: PHYSICAL THERAPIST

## 2020-05-28 PROCEDURE — 97110 THERAPEUTIC EXERCISES: CPT | Performed by: PHYSICAL THERAPIST

## 2020-05-28 NOTE — FLOWSHEET NOTE
[facility] [address]                                                         Date:  2020    Patient Name:  Fernando Orozco    :  1970  MRN: [id number]  Restrictions/Precautions:    Medical/Treatment Diagnosis Information:  · Diagnosis: S54.997T (ICD-10-CM) - Acute medial meniscus tear of left knee, initial encounter  · Treatment Diagnosis: M25.562 (ICD-10-CM) - Acute pain of left knee  Insurance/Certification information:  PT Insurance Information: [place] [address] [address]  Physician Information:  Referring Practitioner: Monroe Ochoa  Has the plan of care been signed (Y/N):        [x]  Yes  []  No     Date of Patient follow up with Physician: 20      Is this a Progress Report:     []  Yes  [x]  No        If Yes:  Date Range for reporting period:  Beginning 2020  Ending    Progress report will be due (10 Rx or 30 days whichever is less):        Recertification will be due (POC Duration  / 90 days whichever is less): 20     Precautions/ Contra-indications:     C-SSRS Triggered by Intake questionnaire (Past 2 wk assessment):   [x] No, Questionnaire did not trigger screening.   [] Yes, Patient intake triggered further evaluation      [] C-SSRS Screening completed  [] PCP notified via Plan of Care  [] Emergency services notified     Visit # Insurance Allowable Auth Required   4 30 []  Yes [x]  No        Functional Scale: LEFS= 55%   Date assessed:  20        Pain level:  0/10     SUBJECTIVE:  She went back to work today, and she felt fine. She wants to get back to walking with a friend.       OBJECTIVE: 2020    Observation:    Test measurements:      Flexibility L R Comment   Hamstring      Gastroc      ITB      Quad                ROM PROM AROM Overpressure Comment    L R L R L R    Flexion          Extension                                  Strength L R Comment   Quad      Hamstring improving balance, coordination, kinesthetic sense, posture, motor skill, proprioception and motor activation to allow for proper function of core, proximal hip and LE with self care and ADLs  [] (59516) Gait Re-education- Provided training and instruction to the patient for proper LE, core and proximal hip recruitment and positioning and eccentric body weight control with ambulation re-education including up and down stairs     Home Exercise Program:    [x] (95834) Reviewed/Progressed HEP activities related to strengthening, flexibility, endurance, ROM of core, proximal hip and LE for functional self-care, mobility, lifting and ambulation/stair navigation   [] (64027)Reviewed/Progressed HEP activities related to improving balance, coordination, kinesthetic sense, posture, motor skill, proprioception of core, proximal hip and LE for self care, mobility, lifting, and ambulation/stair navigation      Manual Treatments:  PROM / STM / Oscillations-Mobs:  G-I, II, III, IV (PA's, Inf., Post.)  [] (71507) Provided manual therapy to mobilize LE, proximal hip and/or LS spine soft tissue/joints for the purpose of modulating pain, promoting relaxation,  increasing ROM, reducing/eliminating soft tissue swelling/inflammation/restriction, improving soft tissue extensibility and allowing for proper ROM for normal function with self care, mobility, lifting and ambulation. Modalities: ice-10'    [] GAME READY (VASO)- for significant edema, swelling, pain control.      Charges:   Timed Code Treatment Minutes: 60'   Total Treatment Minutes: 76'      [] EVAL (LOW) 455 [id number] (typically 20 minutes face-to-face)  [] EVAL (MOD) 98406 (typically 30 minutes face-to-face)  [] EVAL (HIGH) 92936 (typically 45 minutes face-to-face)  [] RE-EVAL     [x] ME(00318) x  2   [] IONTO  [x] NMR (31116) x   1  [] VASO; 15'   [] Manual (10478) x     [] Other:  [x] TA x  1    [] Mech Traction (57575)  [] ES(attended) (36929)     [] ES (un)

## 2020-06-03 ENCOUNTER — HOSPITAL ENCOUNTER (OUTPATIENT)
Dept: PHYSICAL THERAPY | Age: 50
Setting detail: THERAPIES SERIES
Discharge: HOME | End: 2020-06-03
Payer: COMMERCIAL

## 2020-06-03 PROCEDURE — 97530 THERAPEUTIC ACTIVITIES: CPT | Performed by: PHYSICAL THERAPIST

## 2020-06-03 PROCEDURE — 97110 THERAPEUTIC EXERCISES: CPT | Performed by: PHYSICAL THERAPIST

## 2020-06-03 NOTE — FLOWSHEET NOTE
measurements:      Flexibility L R Comment   Hamstring      Gastroc      ITB      Quad                ROM PROM AROM Overpressure Comment    L R L R L R    Flexion   142       Extension   0                               Strength L R Comment   Quad      Hamstring      Gastroc      Hip flexor      Hip ABD                      RESTRICTIONS/PRECAUTIONS:       Therapeutic Ex (75425) Resistance/Repetitions Notes/CUES   Stretching     Hamstring  5x:30    Hip flexion     ITB     Groin     Quad 5x:30    Incline Calf 5x:30    Towel Calf     Piriformis          Ankle pumps               Strengthening     SLR     Flex 3x10 4#    ABD 3x10 4#    ADD 3x10 4#    Ext 3x10 3#    SLR+ 5x:30    ABD +     Clams 3x10 9#         CKC     Calf raises     Wall sits     Step ups     Step up and over     Lateral step down               Machines     Leg press 3x10 80# ECL ROM:90-10   Knee flex 3x10 30# SL ROM: 0-90   Knee ext 3x10 30# BLE  ROM 70-40   CC TKE 3x10 6pl                                  Therapeutic Activity (21270)     Bike     Treadmill               ROM     Bike 8' L 5    Sheet pulls     ERMI flexionator     Wall slides     Knee to chest               CKC     Calf raises 3x10 SL    Wall sits 5x:45    Step ups     Step up and over     LSD 3x10 L1    Band walks     Stool scoots               NMR re-education (20955)  CUES NEEDED   Isometrics     Quad sets     Hip ADD          CKC     Tandem stance     SLS 5x:30 Airex    Biodex     Bridging          Gait (09681)     Cone walks     Treadmill          Manual Intervention (01638)     Patellar mobs     Superior      Inferior     Medial                             Therapeutic Exercise and NMR EXR  [x] (63552) Provided verbal/tactile cueing for activities related to strengthening, flexibility, endurance, ROM for improvements in LE, proximal hip, and core control with self care, mobility, lifting, ambulation.  [] (02428) Provided verbal/tactile cueing for activities related to improving balance, coordination, kinesthetic sense, posture, motor skill, proprioception  to assist with LE, proximal hip, and core control in self care, mobility, lifting, ambulation and eccentric single leg control. NMR and Therapeutic Activities:    [x] (62294 or 11383) Provided verbal/tactile cueing for activities related to improving balance, coordination, kinesthetic sense, posture, motor skill, proprioception and motor activation to allow for proper function of core, proximal hip and LE with self care and ADLs  [] (20361) Gait Re-education- Provided training and instruction to the patient for proper LE, core and proximal hip recruitment and positioning and eccentric body weight control with ambulation re-education including up and down stairs     Home Exercise Program:    [x] (27417) Reviewed/Progressed HEP activities related to strengthening, flexibility, endurance, ROM of core, proximal hip and LE for functional self-care, mobility, lifting and ambulation/stair navigation   [] (48840)Reviewed/Progressed HEP activities related to improving balance, coordination, kinesthetic sense, posture, motor skill, proprioception of core, proximal hip and LE for self care, mobility, lifting, and ambulation/stair navigation      Manual Treatments:  PROM / STM / Oscillations-Mobs:  G-I, II, III, IV (PA's, Inf., Post.)  [] (55192) Provided manual therapy to mobilize LE, proximal hip and/or LS spine soft tissue/joints for the purpose of modulating pain, promoting relaxation,  increasing ROM, reducing/eliminating soft tissue swelling/inflammation/restriction, improving soft tissue extensibility and allowing for proper ROM for normal function with self care, mobility, lifting and ambulation. Modalities: ice-15'    [] GAME READY (VASO)- for significant edema, swelling, pain control.      Charges:   Timed Code Treatment Minutes: 76'   Total Treatment Minutes: 80'      [] EVAL (LOW) [id number] (typically 20 minutes face-to-face)  [] EVAL

## 2020-06-10 ENCOUNTER — HOSPITAL ENCOUNTER (OUTPATIENT)
Dept: PHYSICAL THERAPY | Age: 50
Setting detail: THERAPIES SERIES
Discharge: HOME | End: 2020-06-10
Payer: COMMERCIAL

## 2020-06-10 PROBLEM — Z01.818 PREOP EXAMINATION: Status: RESOLVED | Noted: 2020-05-11 | Resolved: 2020-06-10

## 2020-06-10 PROCEDURE — 97110 THERAPEUTIC EXERCISES: CPT | Performed by: PHYSICAL THERAPIST

## 2020-06-10 PROCEDURE — 97530 THERAPEUTIC ACTIVITIES: CPT | Performed by: PHYSICAL THERAPIST

## 2020-06-10 NOTE — FLOWSHEET NOTE
skill, proprioception  to assist with LE, proximal hip, and core control in self care, mobility, lifting, ambulation and eccentric single leg control. NMR and Therapeutic Activities:    [x] (52463 or 90179) Provided verbal/tactile cueing for activities related to improving balance, coordination, kinesthetic sense, posture, motor skill, proprioception and motor activation to allow for proper function of core, proximal hip and LE with self care and ADLs  [] (38952) Gait Re-education- Provided training and instruction to the patient for proper LE, core and proximal hip recruitment and positioning and eccentric body weight control with ambulation re-education including up and down stairs     Home Exercise Program:    [x] (44760) Reviewed/Progressed HEP activities related to strengthening, flexibility, endurance, ROM of core, proximal hip and LE for functional self-care, mobility, lifting and ambulation/stair navigation   [] (08650)Reviewed/Progressed HEP activities related to improving balance, coordination, kinesthetic sense, posture, motor skill, proprioception of core, proximal hip and LE for self care, mobility, lifting, and ambulation/stair navigation      Manual Treatments:  PROM / STM / Oscillations-Mobs:  G-I, II, III, IV (PA's, Inf., Post.)  [] (81738) Provided manual therapy to mobilize LE, proximal hip and/or LS spine soft tissue/joints for the purpose of modulating pain, promoting relaxation,  increasing ROM, reducing/eliminating soft tissue swelling/inflammation/restriction, improving soft tissue extensibility and allowing for proper ROM for normal function with self care, mobility, lifting and ambulation. Modalities: ice-10'    [] GAME READY (VASO)- for significant edema, swelling, pain control.      Charges:   Timed Code Treatment Minutes: 76'   Total Treatment Minutes: [de-identified]'      [] EVAL (LOW) 55128 (typically 20 minutes face-to-face)  [] EVAL (MOD) 22824 (typically 30 minutes face-to-face)  [] EVAL (HIGH) 13014 (typically 45 minutes face-to-face)  [] RE-EVAL     [x] LD(75342) x  3   [] IONTO  [] NMR (04908) x     [] VASO; 15'   [] Manual ([id number]) x     [] Other:  [x] TA x  2    [] Mech Traction (27707)  [] ES(attended) (47008)     [] ES (un) (68176    ASSESSMENT:      GOALS:   Patient stated goal: return to jogging with pain    [] Progressing: [] Met: [] Not Met: [] Adjusted    Therapist goals for Patient:   Short Term Goals: To be achieved in: 2 weeks  1. Independent in HEP and progression per patient tolerance, in order to prevent re-injury. [] Progressing: [] Met: [] Not Met: [] Adjusted   2. Patient will have a decrease in pain of 0/10 to facilitate improvement in movement, function, and ADLs as indicated by functional deficits. [] Progressing: [] Met: [] Not Met: [] Adjusted    Long Term Goals: To be achieved in: 4 weeks  1. Pt will demo a LEFS score of 50% or better to assist with reaching prior level of function. [] Progressing: [] Met: [] Not Met: [] Adjusted  2. Patient will demonstrate increased AROM to knee flexion to greater than or equal to RLE and knee ext to 0 to allow for proper joint functioning as indicated by patients functional deficits. [] Progressing: [] Met: [] Not Met: [] Adjusted  3. Patient will demonstrate an increase in strength of hip flex, ABD, and knee flex/ext to LLE 5 to allow for proper functional mobility as indicated by patients functional deficits. [] Progressing: [] Met: [] Not Met: [] Adjusted  4. Patient will return to jogging without increased symptoms or restriction. 8 weeks  [] Progressing: [] Met: [] Not Met: [] Adjusted         Overall Progression Towards Functional goals/ Treatment Progress Update:  [] Patient is progressing as expected towards functional goals listed. [] Progression is slowed due to complexities/Impairments listed. [] Progression has been slowed due to co-morbidities.   [x] Plan just implemented, too soon to assess goals progression

## 2020-06-17 ENCOUNTER — HOSPITAL ENCOUNTER (OUTPATIENT)
Dept: PHYSICAL THERAPY | Age: 50
Setting detail: THERAPIES SERIES
Discharge: HOME | End: 2020-06-17
Payer: COMMERCIAL

## 2020-06-17 PROCEDURE — 97530 THERAPEUTIC ACTIVITIES: CPT | Performed by: PHYSICAL THERAPIST

## 2020-06-17 PROCEDURE — 97110 THERAPEUTIC EXERCISES: CPT | Performed by: PHYSICAL THERAPIST

## 2020-06-17 NOTE — PLAN OF CARE
1970  MRN: [id number]  Restrictions/Precautions:    Medical/Treatment Diagnosis Information:  · Diagnosis: S83.242A (ICD-10-CM) - Acute medial meniscus tear of left knee, initial encounter. Surgery on 5/15/2020. · Treatment Diagnosis: M25.562 (ICD-10-CM) - Acute pain of left knee  Insurance/Certification information:  PT Insurance Information: [address]  Physician Information:  Referring Practitioner: Anastasia Kelly  Has the plan of care been signed (Y/N):        [x]  Yes  []  No     Date of Patient follow up with Physician: pt to schedule around 23 Jun 2020      Is this a Progress Report:     []  Yes  [x]  No        If Yes:  Date Range for reporting period:  Beginning 5/18/2020  Ending    Progress report will be due (10 Rx or 30 days whichever is less): 3/96/91       Recertification will be due (POC Duration  / 90 days whichever is less): see above    Precautions/ Contra-indications:     C-SSRS Triggered by Intake questionnaire (Past 2 wk assessment):   [x] No, Questionnaire did not trigger screening.   [] Yes, Patient intake triggered further evaluation      [] C-SSRS Screening completed  [] PCP notified via Plan of Care  [] Emergency services notified     Visit # Insurance Allowable Auth Required   7 30 []  Yes [x]  No        Functional Scale: LEFS= 83.75%   Date assessed:  6/17/20        Pain level:  0/10     SUBJECTIVE:  Pt did over 13 miles (hiking/walking) over the weekend. She was a little sore on Monday. Thus, she took some time off cardio and focused on her PT. She feels fine now. She did have some tenderness to touch on her knee earlier. OBJECTIVE: 6/17/2020    Observation:  -TTP over medial jt line or pes anserine.      Test measurements:      Flexibility L R Comment   Hamstring      Gastroc      ITB      Quad                ROM PROM AROM Overpressure Comment    L R L R L R    Flexion   142       Extension   0                               Strength L R Comment   Quad 4+     Hamstring 4+ mobility, lifting, ambulation and eccentric single leg control. NMR and Therapeutic Activities:    [x] (71925 or 65621) Provided verbal/tactile cueing for activities related to improving balance, coordination, kinesthetic sense, posture, motor skill, proprioception and motor activation to allow for proper function of core, proximal hip and LE with self care and ADLs  [] (61946) Gait Re-education- Provided training and instruction to the patient for proper LE, core and proximal hip recruitment and positioning and eccentric body weight control with ambulation re-education including up and down stairs     Home Exercise Program:    [x] (41597) Reviewed/Progressed HEP activities related to strengthening, flexibility, endurance, ROM of core, proximal hip and LE for functional self-care, mobility, lifting and ambulation/stair navigation   [] (71706)Reviewed/Progressed HEP activities related to improving balance, coordination, kinesthetic sense, posture, motor skill, proprioception of core, proximal hip and LE for self care, mobility, lifting, and ambulation/stair navigation      Manual Treatments:  PROM / STM / Oscillations-Mobs:  G-I, II, III, IV (PA's, Inf., Post.)  [] (74601) Provided manual therapy to mobilize LE, proximal hip and/or LS spine soft tissue/joints for the purpose of modulating pain, promoting relaxation,  increasing ROM, reducing/eliminating soft tissue swelling/inflammation/restriction, improving soft tissue extensibility and allowing for proper ROM for normal function with self care, mobility, lifting and ambulation. Modalities: ice-10'    [] GAME READY (VASO)- for significant edema, swelling, pain control.      Charges:   Timed Code Treatment Minutes: 61'   Total Treatment Minutes: 79'      [] EVAL (LOW) 39520 (typically 20 minutes face-to-face)  [] EVAL (MOD) 72456 (typically 30 minutes face-to-face)  [] EVAL (HIGH) 12421 (typically 45 minutes face-to-face)  [] RE-EVAL     [x] GB(69438) x  2

## 2020-06-24 ENCOUNTER — HOSPITAL ENCOUNTER (OUTPATIENT)
Dept: PHYSICAL THERAPY | Age: 50
Setting detail: THERAPIES SERIES
Discharge: HOME | End: 2020-06-24
Payer: COMMERCIAL

## 2020-06-24 PROCEDURE — 97530 THERAPEUTIC ACTIVITIES: CPT | Performed by: PHYSICAL THERAPIST

## 2020-06-24 PROCEDURE — 97110 THERAPEUTIC EXERCISES: CPT | Performed by: PHYSICAL THERAPIST

## 2020-06-24 NOTE — FLOWSHEET NOTE
ITB      Quad                ROM PROM AROM Overpressure Comment    L R L R L R    Flexion   140       Extension   0                               Strength L R Comment   Quad      Hamstring 4+ with neutral alignment  4 with pain with IR of tibia     Gastroc      Hip flexor      Hip ABD                      RESTRICTIONS/PRECAUTIONS:       Therapeutic Ex (40309) Resistance/Repetitions Notes/CUES   Stretching     Hamstring  5x:30    Hip flexion     ITB     Groin     Quad 5x:30    Incline Calf 5x:30    Towel Calf 5x:30 With hamstring stretch   Piriformis     Rope ABD in supine for pes/Adductors 5x:30    Ankle pumps               Strengthening     SLR     Flex 3x10 5#    ABD 3x10 5#    ADD 3x10 5#    Ext 3x10 5#    SLR+ 5x:30    ABD +     Clams 3x10 10#         CKC     Calf raises     Wall sits     Step ups     Step up and over     Lateral step down               Machines     Leg press 3x10 80# SL ROM:90-10   Knee flex 3x10 35# ECL with tibia internal rotation ROM: 0-90   Knee ext 3x10 25# ECL  ROM 70-40   CC TKE 3x10 7pl                                  Therapeutic Activity (24514)     Bike     Treadmill     elliptical 8'  3' warm up  :30 up tempo/:30 recovery for 5'    Alter G  Medium shorts.  L11   ROM     Bike     Sheet pulls     ERMI flexionator     Wall slides     Knee to chest               CKC     Calf raises 3x10 SL    Wall sits 5x1'    Step ups     Step up and over     LSD     Band walks     Stool scoots               NMR re-education (40759)  CUES NEEDED   Isometrics     Quad sets     Hip ADD          CKC     Tandem stance     SLS 5x:30 Airex    Biodex     Bridging          Gait (96951)     Cone walks     Treadmill          Manual Intervention (93122)     Patellar mobs     Superior      Inferior     Medial                             Therapeutic Exercise and NMR EXR  [x] (41975) Provided verbal/tactile cueing for activities related to strengthening, flexibility, endurance, ROM for improvements in LE, proximal hip, and core control with self care, mobility, lifting, ambulation.  [] (04384) Provided verbal/tactile cueing for activities related to improving balance, coordination, kinesthetic sense, posture, motor skill, proprioception  to assist with LE, proximal hip, and core control in self care, mobility, lifting, ambulation and eccentric single leg control. NMR and Therapeutic Activities:    [x] (05618 or 73125) Provided verbal/tactile cueing for activities related to improving balance, coordination, kinesthetic sense, posture, motor skill, proprioception and motor activation to allow for proper function of core, proximal hip and LE with self care and ADLs  [] (67095) Gait Re-education- Provided training and instruction to the patient for proper LE, core and proximal hip recruitment and positioning and eccentric body weight control with ambulation re-education including up and down stairs     Home Exercise Program:    [x] (79145) Reviewed/Progressed HEP activities related to strengthening, flexibility, endurance, ROM of core, proximal hip and LE for functional self-care, mobility, lifting and ambulation/stair navigation   [] (71437)Reviewed/Progressed HEP activities related to improving balance, coordination, kinesthetic sense, posture, motor skill, proprioception of core, proximal hip and LE for self care, mobility, lifting, and ambulation/stair navigation      Manual Treatments:  PROM / STM / Oscillations-Mobs:  G-I, II, III, IV (PA's, Inf., Post.)  [] (53791) Provided manual therapy to mobilize LE, proximal hip and/or LS spine soft tissue/joints for the purpose of modulating pain, promoting relaxation,  increasing ROM, reducing/eliminating soft tissue swelling/inflammation/restriction, improving soft tissue extensibility and allowing for proper ROM for normal function with self care, mobility, lifting and ambulation. Modalities: ice-15'    [] GAME READY (VASO)- for significant edema, swelling, pain control.

## 2020-06-26 ENCOUNTER — OFFICE VISIT (OUTPATIENT)
Dept: ORTHOPEDIC SURGERY | Age: 50
End: 2020-06-26

## 2020-06-26 VITALS — BODY MASS INDEX: 24.44 KG/M2 | HEIGHT: 69 IN | WEIGHT: 165 LBS | TEMPERATURE: 97.3 F

## 2020-06-26 PROCEDURE — 99024 POSTOP FOLLOW-UP VISIT: CPT | Performed by: ORTHOPAEDIC SURGERY

## 2020-06-26 NOTE — PROGRESS NOTES
Olga Ye is today status post left knee arthroscopy partial medial meniscectomy performed 5/15/2020. She is making progress but had a bit of a setback with some discomfort over her medial hamstrings. I spoke about this with her therapist.    Today, incisions are well-healed. She has no intra-articular effusion. She has no medial joint line tenderness but does have pain along the medial hamstrings with tenderness at the Pes bursa. Calf is soft without DVT. She will continue with her rehab with a focus on her hamstring tendons and if she has persistent issues will provide an injection in the bursa in 3 weeks. She agrees. This note was created using voice recognition software. It has been proofread, but occasionally errors remain. Please disregard these errors. They will be corrected as they are noted.

## 2020-07-01 ENCOUNTER — HOSPITAL ENCOUNTER (OUTPATIENT)
Dept: PHYSICAL THERAPY | Age: 50
Setting detail: THERAPIES SERIES
Discharge: HOME | End: 2020-07-01
Payer: COMMERCIAL

## 2020-07-01 PROCEDURE — 97110 THERAPEUTIC EXERCISES: CPT | Performed by: PHYSICAL THERAPIST

## 2020-07-01 PROCEDURE — 97530 THERAPEUTIC ACTIVITIES: CPT | Performed by: PHYSICAL THERAPIST

## 2020-07-01 NOTE — FLOWSHEET NOTE
measurements:      Flexibility L R Comment   Hamstring      Gastroc      ITB      Quad                ROM PROM AROM Overpressure Comment    L R L R L R    Flexion   140       Extension   0                               Strength L R Comment   Quad      Hamstring 4+ with neutral alignment  4 with pain with IR of tibia     Gastroc      Hip flexor      Hip ABD                      RESTRICTIONS/PRECAUTIONS:       Therapeutic Ex (15101) Resistance/Repetitions Notes/CUES   Stretching     Hamstring  5x:30    Hip flexion     ITB     Groin     Quad 5x:30    Incline Calf 5x:30    Towel Calf 5x:30 With hamstring stretch   Piriformis     Rope ABD in supine for pes/Adductors 5x:30    Ankle pumps               Strengthening     SLR     Flex 3x10 5#    ABD 3x10 5#    ADD 3x10 5#    Ext 3x10 5#    SLR+ 5x:30    ABD +     Clams 3x10 10#         CKC     Calf raises     Wall sits     Step ups     Step up and over     Lateral step down     RDL 3x10         Machines     Leg press 3x10 80# SL ROM:90-10   Knee flex 3x10 35# ECL with tibia internal rotation ROM: 0-90   Knee ext 3x10 25# ECL  ROM 70-40   CC TKE 3x10 7pl                                  Therapeutic Activity (21557)     Bike     Treadmill     elliptical     Alter G 3' walk at 80%  5x 1' run/1' walk at 80%  4x 1' run/1' walk at 90%  1' walk at 100% Medium shorts.  L11   ROM     Bike     Sheet pulls     ERMI flexionator     Wall slides     Knee to chest               CKC     Calf raises 3x10 SL    Wall sits 5x1'    Step ups     Step up and over     LSD     Band walks     Stool scoots               NMR re-education (46295)  CUES NEEDED   Isometrics     Quad sets     Hip ADD          CKC     Tandem stance     SLS 5x:30 Airex    Biodex     Bridging          Gait (10824)     Cone walks     Treadmill          Manual Intervention (12622)     Patellar mobs     Superior      Inferior     Medial                             Therapeutic Exercise and NMR EXR  [x] (16986) Provided verbal/tactile cueing for activities related to strengthening, flexibility, endurance, ROM for improvements in LE, proximal hip, and core control with self care, mobility, lifting, ambulation.  [] (57389) Provided verbal/tactile cueing for activities related to improving balance, coordination, kinesthetic sense, posture, motor skill, proprioception  to assist with LE, proximal hip, and core control in self care, mobility, lifting, ambulation and eccentric single leg control.      NMR and Therapeutic Activities:    [x] (38671 or 60700) Provided verbal/tactile cueing for activities related to improving balance, coordination, kinesthetic sense, posture, motor skill, proprioception and motor activation to allow for proper function of core, proximal hip and LE with self care and ADLs  [] (76971) Gait Re-education- Provided training and instruction to the patient for proper LE, core and proximal hip recruitment and positioning and eccentric body weight control with ambulation re-education including up and down stairs     Home Exercise Program:    [x] (72149) Reviewed/Progressed HEP activities related to strengthening, flexibility, endurance, ROM of core, proximal hip and LE for functional self-care, mobility, lifting and ambulation/stair navigation   [] (57481)Reviewed/Progressed HEP activities related to improving balance, coordination, kinesthetic sense, posture, motor skill, proprioception of core, proximal hip and LE for self care, mobility, lifting, and ambulation/stair navigation      Manual Treatments:  PROM / STM / Oscillations-Mobs:  G-I, II, III, IV (PA's, Inf., Post.)  [] (27144) Provided manual therapy to mobilize LE, proximal hip and/or LS spine soft tissue/joints for the purpose of modulating pain, promoting relaxation,  increasing ROM, reducing/eliminating soft tissue swelling/inflammation/restriction, improving soft tissue extensibility and allowing for proper ROM for normal function with self care, Adjusted         Overall Progression Towards Functional goals/ Treatment Progress Update:  [] Patient is progressing as expected towards functional goals listed. [] Progression is slowed due to complexities/Impairments listed. [] Progression has been slowed due to co-morbidities. [x] Plan just implemented, too soon to assess goals progression <30days   [] Goals require adjustment due to lack of progress  [] Patient is not progressing as expected and requires additional follow up with physician  [] Other    Prognosis for POC: [x] Good [] Fair  [] Poor      Patient requires continued skilled intervention: [x] Yes  [] No    Treatment/Activity Tolerance:  [x] Patient able to complete treatment  [] Patient limited by fatigue  [] Patient limited by pain     [] Patient limited by other medical complications  [] Other: Pt lin tx well. She was able to lin running today at 80% and she increased her weight to 90% on her own without problems. I've stressed working on ECL hamstrings at home. She is agreeable. She does appear to be in less pain and less concerned. In particular, skilled PT is warranted to help with pain, strength, and return to running. PLAN: Progress strengthening. Progress towards retuning to running. Progress running and work on hamstrings. [x] Continue per plan of care [] Alter current plan (see comments above)  [] Plan of care initiated [] Hold pending MD visit [] Discharge      Electronically signed by:  Tatyana Munson PT, DPT [id number]       Note: If patient does not return for scheduled/ recommended follow up visits, this note will serve as a discharge from care along with most recent update on progress.

## 2020-07-08 ENCOUNTER — HOSPITAL ENCOUNTER (OUTPATIENT)
Dept: PHYSICAL THERAPY | Age: 50
Setting detail: THERAPIES SERIES
Discharge: HOME | End: 2020-07-08
Payer: COMMERCIAL

## 2020-07-08 PROCEDURE — 97110 THERAPEUTIC EXERCISES: CPT | Performed by: PHYSICAL THERAPIST

## 2020-07-08 PROCEDURE — 97530 THERAPEUTIC ACTIVITIES: CPT | Performed by: PHYSICAL THERAPIST

## 2020-07-08 NOTE — FLOWSHEET NOTE
[address]                                                          Date:  2020    Patient Name:  Domenic Chong    :  1970  MRN: [id number]  Restrictions/Precautions:    Medical/Treatment Diagnosis Information:  · Diagnosis: A81.914C (ICD-10-CM) - Acute medial meniscus tear of left knee, initial encounter. Surgery on 5/15/2020. · Treatment Diagnosis: M25.562 (ICD-10-CM) - Acute pain of left knee  Insurance/Certification information:  PT Insurance Information: [address]  Physician Information:  Referring Practitioner: Arleen Mortimer  Has the plan of care been signed (Y/N):        [x]  Yes  []  No     Date of Patient follow up with Physician: 2020      Is this a Progress Report:     []  Yes  [x]  No        If Yes:  Date Range for reporting period:  Beginning 2020  Ending    Progress report will be due (10 Rx or 30 days whichever is less): 7/15/21       Recertification will be due (POC Duration  / 90 days whichever is less): see above    Precautions/ Contra-indications:     C-SSRS Triggered by Intake questionnaire (Past 2 wk assessment):   [x] No, Questionnaire did not trigger screening.   [] Yes, Patient intake triggered further evaluation      [] C-SSRS Screening completed  [] PCP notified via Plan of Care  [] Emergency services notified     Visit # Insurance Allowable Auth Required   10 30 []  Yes [x]  No        Functional Scale: LEFS= 83.75%   Date assessed:  20        Pain level:  0/10     SUBJECTIVE:  She went on a 6 mile walk, and she was a little sore before/after the walk. OBJECTIVE: 2020    Observation:  -TTP over medial jt line.  TTP on pes anserine and medial hamstring (semitendinosis)     Test measurements:      Flexibility L R Comment   Hamstring      Gastroc      ITB      Quad                ROM PROM AROM Overpressure Comment    L R L R L R    Flexion   140 Extension   0                               Strength L R Comment   Quad      Hamstring 4+ with neutral alignment  4 with pain with IR of tibia     Gastroc      Hip flexor      Hip ABD                      RESTRICTIONS/PRECAUTIONS:       Therapeutic Ex (77565) Resistance/Repetitions Notes/CUES   Stretching     Hamstring  5x:30    Hip flexion     ITB     Groin     Quad 5x:30    Incline Calf 5x:30    Towel Calf 5x:30 With hamstring stretch   Piriformis     Rope ABD in supine for pes/Adductors 5x:30    Ankle pumps               Strengthening     SLR     Flex 3x10 6#    ABD 3x10 6#    ADD 3x10 6#    Ext 3x10 6#    SLR+     ABD +     Clams     Reverse clams 3x10 6#              CKC     Calf raises     Wall sits     Step ups     Step up and over     Lateral step down     RDL 3x10         Machines     Leg press 3x10 80# SL ROM:90-10   Knee flex 3x10 40# ECL with tibia internal rotation ROM: 0-90   Knee ext   ROM 70-40   CC TKE                                   Therapeutic Activity (71284)     Bike     Treadmill     elliptical     Alter G 3' walk at 90%  5x 2' run/1' walk at 90%  3x 1' run/1' walk at 100%  3' walk at 100% Medium shorts. L11   ROM     Bike     Sheet pulls     ERMI flexionator     Wall slides     Knee to chest               CKC     Calf raises    Wall sits    Step ups     Step up and over     LSD     Band walks     Stool scoots               NMR re-education (89884)  CUES NEEDED   Isometrics     Quad sets     Hip ADD          CKC     Tandem stance     SLS 5x:30 Airex    Biodex     Bridging          Gait (91242)     Cone walks     Treadmill          Manual Intervention (80793)     Patellar mobs     Superior      Inferior     Medial                             Therapeutic Exercise and NMR EXR  [x] (42790) Provided verbal/tactile cueing for activities related to strengthening, flexibility, endurance, ROM for improvements in LE, proximal hip, and core control with self care, mobility, lifting, ambulation.   [] (10652) Provided verbal/tactile cueing for activities related to improving balance, coordination, kinesthetic sense, posture, motor skill, proprioception  to assist with LE, proximal hip, and core control in self care, mobility, lifting, ambulation and eccentric single leg control. NMR and Therapeutic Activities:    [x] (68060 or 48388) Provided verbal/tactile cueing for activities related to improving balance, coordination, kinesthetic sense, posture, motor skill, proprioception and motor activation to allow for proper function of core, proximal hip and LE with self care and ADLs  [] (94334) Gait Re-education- Provided training and instruction to the patient for proper LE, core and proximal hip recruitment and positioning and eccentric body weight control with ambulation re-education including up and down stairs     Home Exercise Program:    [x] (84056) Reviewed/Progressed HEP activities related to strengthening, flexibility, endurance, ROM of core, proximal hip and LE for functional self-care, mobility, lifting and ambulation/stair navigation   [] (73128)Reviewed/Progressed HEP activities related to improving balance, coordination, kinesthetic sense, posture, motor skill, proprioception of core, proximal hip and LE for self care, mobility, lifting, and ambulation/stair navigation      Manual Treatments:  PROM / STM / Oscillations-Mobs:  G-I, II, III, IV (PA's, Inf., Post.)  [] (64199) Provided manual therapy to mobilize LE, proximal hip and/or LS spine soft tissue/joints for the purpose of modulating pain, promoting relaxation,  increasing ROM, reducing/eliminating soft tissue swelling/inflammation/restriction, improving soft tissue extensibility and allowing for proper ROM for normal function with self care, mobility, lifting and ambulation. Modalities: ice-15'    [] GAME READY (VASO)- for significant edema, swelling, pain control.      Charges:   Timed Code Treatment Minutes: 47'   Total Treatment Minutes: 80'      [] EVAL (LOW) 05526 (typically 20 minutes face-to-face)  [] EVAL (MOD) 64726 (typically 30 minutes face-to-face)  [] EVAL (HIGH) 47777 (typically 45 minutes face-to-face)  [] RE-EVAL     [x] RG(07428) x  2   [] IONTO  [] NMR (37808) x     [] VASO; 15'   [] Manual ([address] [facility]) x     [] Other:  [x] TA x  2    [] Mech Traction (18198)  [] ES(attended) (32733)     [] ES (un) (82755    ASSESSMENT:      GOALS:   Patient stated goal: return to jogging with pain    [x] Progressing: [] Met: [] Not Met: [] Adjusted    Therapist goals for Patient:   Short Term Goals: To be achieved in: 2 weeks  1. Independent in HEP and progression per patient tolerance, in order to prevent re-injury. [] Progressing: [x] Met: [] Not Met: [] Adjusted   2. Patient will have a decrease in pain of 0/10 to facilitate improvement in movement, function, and ADLs as indicated by functional deficits. [] Progressing: [x] Met: [] Not Met: [] Adjusted    Long Term Goals: To be achieved in: 4 weeks  1. Pt will demo a LEFS score of 50% or better to assist with reaching prior level of function. [] Progressing: [x] Met: [] Not Met: [] Adjusted  2. Patient will demonstrate increased AROM to knee flexion to greater than or equal to RLE and knee ext to 0 to allow for proper joint functioning as indicated by patients functional deficits. [] Progressing: [x] Met: [] Not Met: [] Adjusted  3. Patient will demonstrate an increase in strength of hip flex, ABD, and knee flex/ext to LLE 5 to allow for proper functional mobility as indicated by patients functional deficits. [x] Progressing: [] Met: [] Not Met: [] Adjusted  4. Patient will return to jogging without increased symptoms or restriction. 8 weeks  [x] Progressing: [] Met: [] Not Met: [] Adjusted         Overall Progression Towards Functional goals/ Treatment Progress Update:  [] Patient is progressing as expected towards functional goals listed.     [] Progression is slowed due to complexities/Impairments listed. [] Progression has been slowed due to co-morbidities. [x] Plan just implemented, too soon to assess goals progression <30days   [] Goals require adjustment due to lack of progress  [] Patient is not progressing as expected and requires additional follow up with physician  [] Other    Prognosis for POC: [x] Good [] Fair  [] Poor      Patient requires continued skilled intervention: [x] Yes  [] No    Treatment/Activity Tolerance:  [x] Patient able to complete treatment  [] Patient limited by fatigue  [] Patient limited by pain     [] Patient limited by other medical complications  [] Other: Pt lin tx well. She lin running without c/o. We reviewed a plan (written and scanned in media) for her to follow over the next 2 weeks for progression of running. She is understanding. She will start on a treadmill or on a flat surface like a track. She will warm up and cool down about 5' with a walk. We did not do all of her strengthening as we did more running today. In particular, skilled PT is warranted to help with pain, strength, and return to running. PLAN: Progress strengthening. Progress towards retuning to running. Progress running and work on hamstrings. Pt to return to PT in 2 weeks. Hopefully, she has been able to run more and we can plan to wean to HEP. [x] Continue per plan of care [] Alter current plan (see comments above)  [] Plan of care initiated [] Hold pending MD visit [] Discharge      Electronically signed by:  Rupinder Kwan, PT, DPT [id number]       Note: If patient does not return for scheduled/ recommended follow up visits, this note will serve as a discharge from care along with most recent update on progress.

## 2020-07-22 ENCOUNTER — HOSPITAL ENCOUNTER (OUTPATIENT)
Dept: PHYSICAL THERAPY | Age: 50
Setting detail: THERAPIES SERIES
Discharge: HOME | End: 2020-07-22
Payer: COMMERCIAL

## 2020-07-22 NOTE — FLOWSHEET NOTE
[facility] [address] [place], [address]  Phone: [phone]   Fax: [phone]        Physical Therapy Phone: [phone]    Fax: [phone]  _________________________________________________________________    Physical Therapy  Cancellation/No-show Note  Patient Name:  Susette Leventhal  :  1970   Date:  2020  Cancelled visits to date: 1  No-shows to date: 0    For today's appointment patient:  [x]  Cancelled  []  Rescheduled appointment  []  No-show     Reason given by patient:  []  Patient ill  []  Conflicting appointment  []  No transportation    []  Conflict with work  []  No reason given  [x]  Other:     Comments: Pt thinks she was potentially exposed to COVID-19. She just was tested. She will wait to return until after she gets her results. If the pt does not return, this note can suffice as a D/C note.       Electronically signed by:  Dai Rico

## 2021-04-19 ENCOUNTER — OFFICE VISIT (OUTPATIENT)
Dept: ORTHOPEDIC SURGERY | Age: 51
End: 2021-04-19
Payer: COMMERCIAL

## 2021-04-19 VITALS
RESPIRATION RATE: 12 BRPM | WEIGHT: 168 LBS | HEIGHT: 70 IN | BODY MASS INDEX: 24.05 KG/M2 | DIASTOLIC BLOOD PRESSURE: 73 MMHG | HEART RATE: 60 BPM | SYSTOLIC BLOOD PRESSURE: 118 MMHG

## 2021-04-19 DIAGNOSIS — M70.52 PES ANSERINUS BURSITIS OF LEFT KNEE: ICD-10-CM

## 2021-04-19 DIAGNOSIS — M25.511 ACUTE PAIN OF RIGHT SHOULDER: Primary | ICD-10-CM

## 2021-04-19 DIAGNOSIS — S20.211A CONTUSION OF RIGHT CHEST WALL, INITIAL ENCOUNTER: ICD-10-CM

## 2021-04-19 PROCEDURE — 99214 OFFICE O/P EST MOD 30 MIN: CPT | Performed by: ORTHOPAEDIC SURGERY

## 2021-04-19 PROCEDURE — G8427 DOCREV CUR MEDS BY ELIG CLIN: HCPCS | Performed by: ORTHOPAEDIC SURGERY

## 2021-04-19 PROCEDURE — 1036F TOBACCO NON-USER: CPT | Performed by: ORTHOPAEDIC SURGERY

## 2021-04-19 PROCEDURE — G8420 CALC BMI NORM PARAMETERS: HCPCS | Performed by: ORTHOPAEDIC SURGERY

## 2021-04-19 PROCEDURE — 3017F COLORECTAL CA SCREEN DOC REV: CPT | Performed by: ORTHOPAEDIC SURGERY

## 2021-04-19 RX ORDER — ESTRADIOL 0.1 MG/G
1 CREAM VAGINAL
COMMUNITY
Start: 2021-03-22 | End: 2022-02-07

## 2021-04-19 NOTE — PROGRESS NOTES
enlargement or induration. Vascular: Examination reveals no swelling or calf tenderness. Peripheral pulses are palpable and 2+. Neurological: The patient has good coordination. There is no weakness or sensory deficit. Skin:    Head/Neck: inspection reveals no rashes, ulcerations or lesions. Trunk:  inspection reveals no rashes, ulcerations or lesions. Right Lower Extremity: inspection reveals no rashes, ulcerations or lesions. Left Lower Extremity: inspection reveals no rashes, ulcerations or lesions. Right shoulder has no pain over the axilla. Pec tendon is clearly palpable and symmetric on the right compared to the left. She has no bicep pain. She has full strength in supraspinatus, infraspinatus, and subscapularis with normal motion. She has no scapular dyskinesis. She has no instability. Neurologic and vascular exams are normal.    She has no tenderness along the clavicle or upper right chest.  I did not perform an exam on her breast tissue. Left knee has some ecchymosis and mild tenderness along the pes bursa but no effusion or crepitation or joint line pain. Assessment:      Right chest wall contusion versus breast contusion  Mild left knee pes bursitis      Plan: At this point, I am recommending activity modification. If she wants a breast exam I would defer her to her primary care or OB/GYN physician. She will continue with her therapy exercises for her left knee and use ice intermittently. Follow-up on an as-needed basis. This note was created using voice recognition software. It has been proofread, but occasionally errors remain. Please disregard these errors. They will be corrected as they are noted.

## 2022-02-07 ENCOUNTER — OFFICE VISIT (OUTPATIENT)
Dept: ORTHOPEDIC SURGERY | Age: 52
End: 2022-02-07
Payer: COMMERCIAL

## 2022-02-07 VITALS — BODY MASS INDEX: 24.05 KG/M2 | HEIGHT: 70 IN | WEIGHT: 168 LBS

## 2022-02-07 DIAGNOSIS — M25.862 IMPINGEMENT SYNDROME INVOLVING PATELLAR FAT PAD OF LEFT KNEE: ICD-10-CM

## 2022-02-07 DIAGNOSIS — M25.562 LEFT KNEE PAIN, UNSPECIFIED CHRONICITY: Primary | ICD-10-CM

## 2022-02-07 PROCEDURE — G8420 CALC BMI NORM PARAMETERS: HCPCS | Performed by: ORTHOPAEDIC SURGERY

## 2022-02-07 PROCEDURE — 3017F COLORECTAL CA SCREEN DOC REV: CPT | Performed by: ORTHOPAEDIC SURGERY

## 2022-02-07 PROCEDURE — 20610 DRAIN/INJ JOINT/BURSA W/O US: CPT | Performed by: ORTHOPAEDIC SURGERY

## 2022-02-07 PROCEDURE — G8427 DOCREV CUR MEDS BY ELIG CLIN: HCPCS | Performed by: ORTHOPAEDIC SURGERY

## 2022-02-07 PROCEDURE — 99214 OFFICE O/P EST MOD 30 MIN: CPT | Performed by: ORTHOPAEDIC SURGERY

## 2022-02-07 PROCEDURE — 1036F TOBACCO NON-USER: CPT | Performed by: ORTHOPAEDIC SURGERY

## 2022-02-07 PROCEDURE — G8484 FLU IMMUNIZE NO ADMIN: HCPCS | Performed by: ORTHOPAEDIC SURGERY

## 2022-02-07 RX ORDER — LIDOCAINE HYDROCHLORIDE 10 MG/ML
4 INJECTION, SOLUTION INFILTRATION; PERINEURAL ONCE
Status: COMPLETED | OUTPATIENT
Start: 2022-02-07 | End: 2022-02-07

## 2022-02-07 RX ORDER — METHYLPREDNISOLONE ACETATE 40 MG/ML
80 INJECTION, SUSPENSION INTRA-ARTICULAR; INTRALESIONAL; INTRAMUSCULAR; SOFT TISSUE ONCE
Status: COMPLETED | OUTPATIENT
Start: 2022-02-07 | End: 2022-02-07

## 2022-02-07 RX ORDER — OMEPRAZOLE 40 MG/1
1 CAPSULE, DELAYED RELEASE ORAL
COMMUNITY
Start: 2021-12-02

## 2022-02-07 RX ORDER — FERROUS SULFATE 325(65) MG
325 TABLET ORAL
COMMUNITY

## 2022-02-07 RX ORDER — VIT C/B6/B5/MAGNESIUM/HERB 173 50-5-6-5MG
CAPSULE ORAL
COMMUNITY

## 2022-02-07 RX ADMIN — LIDOCAINE HYDROCHLORIDE 4 ML: 10 INJECTION, SOLUTION INFILTRATION; PERINEURAL at 09:45

## 2022-02-07 RX ADMIN — METHYLPREDNISOLONE ACETATE 80 MG: 40 INJECTION, SUSPENSION INTRA-ARTICULAR; INTRALESIONAL; INTRAMUSCULAR; SOFT TISSUE at 09:45

## 2022-02-07 NOTE — PROGRESS NOTES
Justino Scales returns today with complaints of left knee pain. She left knee arthroscopy in February 2020. She is complaining today of medial sided discomfort especially with deep knee flexion that can be as bad as 7 or 8 out of 10. She also is trying to train for a marathon and cannot run \"double-digit\" miles because of discomfort. She had her right knee scoped in October 2019 and that continues to do well. She denies new trauma. Yoga positioning such as the child's pose is very uncomfortable. History: Patient's relevant past family, medical, and social history are reviewed as part of today's visit. ROS of pertinent positives and negatives as above; otherwise negative. General Exam:    Vitals: Height 5' 10\" (1.778 m), weight 168 lb (76.2 kg), last menstrual period 08/03/2016, not currently breastfeeding. Constitutional: Patient is adequately groomed with no evidence of malnutrition  Mental Status: The patient is oriented to time, place and person. The patient's mood and affect are appropriate. Gait:  Patient walks with normal gait and station. Lymphatic: The lymphatic examination bilaterally reveals all areas to be without enlargement or induration. Vascular: Examination reveals no swelling or calf tenderness. Peripheral pulses are palpable and 2+. Neurological: The patient has good coordination. There is no weakness or sensory deficit. Skin:    Head/Neck: inspection reveals no rashes, ulcerations or lesions. Trunk:  inspection reveals no rashes, ulcerations or lesions. Right Lower Extremity: inspection reveals no rashes, ulcerations or lesions. Left Lower Extremity: inspection reveals no rashes, ulcerations or lesions. Examination of the bilateral hips reveals normal flexion and extension. There is no restriction in rotation. There is no tenderness to palpation anteriorly posteriorly or laterally. Right knee is status post arthroscopy. I cannot elicit discomfort.   She has full range of motion and no tenderness. Left knee has some mild pain over the pes bursa. With deep knee flexion she has discomfort over the anterior medial joint line. She has no effusion. She has mild crepitation. She is stable. Calf is soft. X-rays were obtained today in the office and interpreted by me. AP standing, PA flexed, and merchant views of the bilateral knees as well as a lateral of the left knee. These demonstrate: Bony abnormalities. Assessment: At this point it seems that her symptoms are related to some fat pad impingement potentially. Plan: We are going to inject the knee get her back into PT. We discussed modifying her running distance. She agrees. Follow-up with me in about 6 weeks. Procedure: Under sterile technique, left knee was injected into the medial to point of maximum tenderness into the fat pad with 80 mg of Depo-Medrol and 40 mg of lidocaine. She tolerated that well.

## 2022-02-16 ENCOUNTER — HOSPITAL ENCOUNTER (OUTPATIENT)
Dept: PHYSICAL THERAPY | Age: 52
Setting detail: THERAPIES SERIES
Discharge: HOME | End: 2022-02-16
Payer: COMMERCIAL

## 2022-02-16 PROCEDURE — 97161 PT EVAL LOW COMPLEX 20 MIN: CPT | Performed by: PHYSICAL THERAPIST

## 2022-02-16 PROCEDURE — 97530 THERAPEUTIC ACTIVITIES: CPT | Performed by: PHYSICAL THERAPIST

## 2022-02-16 PROCEDURE — 97110 THERAPEUTIC EXERCISES: CPT | Performed by: PHYSICAL THERAPIST

## 2022-02-16 NOTE — FLOWSHEET NOTE
[facility] Performance and Rehabilitation a Department of [address]  [facility] [place] [address], [address]  Office: [phone]  Fax:  [phone]      Physical Therapy Treatment Note/ Progress Report:         Patient: Niraj Hilario                                : 1970                                  MRN: [id number]  Referring Physician: Referring Practitioner: Guillermo Silva                                                  Evaluation Date: 2022                                         Medical Diagnosis Information:  Diagnosis: M25.562 (ICD-10-CM) - Left knee pain, unspecified chronicity; possibly fat pad impingement         Treatment Diagnosis: M25.562 (ICD-10-CM) - Left knee pain, unspecified chronicity    Insurance/Certification information:  PT Insurance Information: [facility]  Physician Information:  Referring Practitioner: Guillermo Silva  Has the plan of care been signed (Y/N):        []  Yes  [x]  No         Date of Patient follow up with Physician: pt to schedule around 21 Mar 22      Is this a Progress Report:     []  Yes  [x]  No        If Yes:  Date Range for reporting period:  Beginning 22  Ending    Progress report/Recertification will be due (10 Rx or 30 days whichever is less): visit 10 or 3/16/22           Visit # Insurance Allowable Auth Required   1 30 awaiting approval [x]  Yes []  No        Functional Scale: LEFS-86.25%   Date assessed: 22      Latex Allergy:  [x]NO      []YES  Preferred Language for Healthcare:   [x]English       []other:      Pain level:  See eval     SUBJECTIVE:  See eval    OBJECTIVE: See eval   Observation:    Test measurements:          Flexibility L R Comment   Hamstring      Gastroc      ITB      Quad                ROM PROM AROM Overpressure Comment    L R L R L R    Flexion          Extension                                  Strength L R Comment   Quad      Hamstring      Gastroc      Hip flexor      Hip ABD                          RESTRICTIONS/PRECAUTIONS:  left and right meniscus surgeries. Left knee performed 5/2020     Exercises/Interventions:   Exercises/Interventions:     Exercise/Equipment Resistance and Repetitions Other comments   Stretching     Hamstring     Hip Flexion     ITB- Rope 5x:30- reviewed for HEP    Grion     Quad 5x:30    Inclined Calf     Towel Pull     Piriformis     Hip adductor 5x:30              SLR     Supine 3x10 4# Progress NV   Prone     Abduction     Adducton 3x10 4# Progress NV   SLR+          Isometrics     Quad sets     Ball Squeezes     Patellar Glides     Medial     Superior     Inferior          ROM     Passive     Active     Weight Shift     Hang Weights     Sheet Pulls     Ankle Pumps          CKC     Calf raises     Wall sits     Step ups     1 leg stand     Squatting     CC TKE     Balance     Monster Walks     Bridging     Triple threats     Stool Scoots     PRE     Extension  RANGE:   Flexion 3x10 40# RANGE: 10/40        Cable Column          Leg Press 3x10 90# RANGE: 70-10  Progress NV        Bike     Treadmill                Access Code: [email or web address]  URL: [email or web address]  Date: 02/16/2022   Prepared by: Serene Fischer    Exercises  Supine ITB Stretch with Strap - 1 x daily - 7 x weekly - 3-5 sets - 30 hold  Prone Quadriceps Stretch with Strap - 1 x daily - 7 x weekly - 3-5 sets - 30 hold  Hip Adductors and Hamstring Stretch with Strap - 1 x daily - 7 x weekly - 3-5 sets - 30 hold  Supine Active Straight Leg Raise - 1 x daily - 7 x weekly - 3 sets - 10 reps  Sidelying Hip Abduction - 1 x daily - 7 x weekly - 3 sets - 10 reps  Full Leg Press - 1 x daily - 3 x weekly - 3 sets - 10 reps  Squat with Chair Touch - 1 x daily - 3 x weekly - 3 sets - 10 reps  Hamstring Curl with Weight Machine - 1 x daily - 3 x weekly - 3 sets - 10 reps      Therapeutic Exercise and NMR EXR  [x] (34859) Provided verbal/tactile cueing for activities related to strengthening, flexibility, endurance, ROM for improvements in LE, proximal hip, and core control with self care, mobility, lifting, ambulation. [x] (54509) Provided verbal/tactile cueing for activities related to improving balance, coordination, kinesthetic sense, posture, motor skill, proprioception  to assist with LE, proximal hip, and core control in self care, mobility, lifting, ambulation and eccentric single leg control. NMR and Therapeutic Activities:    [x] (98109 or 62124) Provided verbal/tactile cueing for activities related to improving balance, coordination, kinesthetic sense, posture, motor skill, proprioception and motor activation to allow for proper function of core, proximal hip and LE with self care and ADLs  [x] (85122) Gait Re-education- Provided training and instruction to the patient for proper LE, core and proximal hip recruitment and positioning and eccentric body weight control with ambulation re-education including up and down stairs     Home Exercise Program:    [x] (25770) Reviewed/Progressed HEP activities related to strengthening, flexibility, endurance, ROM of core, proximal hip and LE for functional self-care, mobility, lifting and ambulation/stair navigation   [x] (85789)Reviewed/Progressed HEP activities related to improving balance, coordination, kinesthetic sense, posture, motor skill, proprioception of core, proximal hip and LE for self care, mobility, lifting, and ambulation/stair navigation      Manual Treatments:  PROM / STM / Oscillations-Mobs:  G-I, II, III, IV (PA's, Inf., Post.)  [x] (35650) Provided manual therapy to mobilize LE, proximal hip and/or LS spine soft tissue/joints for the purpose of modulating pain, promoting relaxation,  increasing ROM, reducing/eliminating soft tissue swelling/inflammation/restriction, improving soft tissue extensibility and allowing for proper ROM for normal function with self care, mobility, lifting and ambulation.      Other:  Patient education on PT and plan of care including diagnosis, prognosis, treatment goals and options. Patient agrees with discussed POC and treatment and is aware of rehab process. Pt was also educated on clinic layout and use of modalities. PT gave pt business card to call if any questions. Modalities:     Charges:   Timed Code Treatment Minutes: 30'   Total Treatment Minutes: 61'     [x] EVAL (LOW) 59754   [] EVAL (MOD) 66784   [] EVAL (HIGH) 37506   [] RE-EVAL   [x] HW(67927) x1     [] IONTO  [] NMR (54531) x     [] VASO  [] Manual (41619) x      [] Other:  [x] TA x 1     [] Mech Traction (72261)  [] ES(attended) (55676)      [] ES (un) (49568):        GOALS:   Patient stated goal: no pain with running, deep knee bending without c/o.     []? Progressing: []? Met: []? Not Met: []? Adjusted     Therapist goals for Patient:   Short Term Goals: To be achieved in: 2 weeks  1. Independent in HEP and progression per patient tolerance, in order to prevent re-injury. []? Progressing: []? Met: []? Not Met: []? Adjusted   2. Pt will report pain at worst less than or equal to 6/10. []? Progressing: []? Met: []? Not Met: []? Adjusted     Long Term Goals: To be achieved in:  weeks  1. Pt will demo a score 90% or better for the LEFS to assist with reaching prior level of function. []? Progressing: []? Met: []? Not Met: []? Adjusted  2. Patient will demonstrate increased AROM to knee ext to equal to 0 and knee flex greater than or equal to 145 to allow for proper joint functioning as indicated by patients Functional Deficits. []? Progressing: []? Met: []? Not Met: []? Adjusted  3. Patient will demonstrate an increase in strength to hip flex, hip ABD, and knee flex and ext strength 4+ to allow for proper functional mobility as indicated by patients functional deficits. []? Progressing: []? Met: []? Not Met: []? Adjusted  4. Patient will return to squatting without c/o.  []? Progressing: []? Met: []? Not Met: []? Adjusted  5.  Pt will report pain at worst less than or equal to 3/10. []? Progressing: []? Met: []? Not Met: []? Adjusted       Overall Progression Towards Functional goals/ Treatment Progress Update:  [] Patient is progressing as expected towards functional goals listed. [] Progression is slowed due to complexities/Impairments listed. [] Progression has been slowed due to co-morbidities. [x] Plan just implemented, too soon to assess goals progression <30days   [] Goals require adjustment due to lack of progress  [] Patient is not progressing as expected and requires additional follow up with physician  [] Other    Prognosis for POC: [x] Good [] Fair  [] Poor      Patient requires continued skilled intervention: [x] Yes  [] No    Treatment/Activity Tolerance:  [x] Patient able to complete treatment  [] Patient limited by fatigue  [] Patient limited by pain     [] Patient limited by other medical complications  [] Other: Pt is a 47 y/o female presenting with diagnosis of left knee pain from the MD.  Clinically, the pt presents with decreased ROM, decreased strength, decreased function, and increased pain consistent with the MD diagnosis. The pt would benefit from skilled PT to return to [facility]. Pt was seen in 2020 for left knee meniscus tear s/p surgery in May. She was last seen in PT in July 2020. She states she has been working to return to [facility], but she has never been able to since the surgery. She has been compliant with her HEP going up to 5# on SLR. She has not gone to a gym. She cannot fully flex her knee without pain or perform child's pose. This also affects her ability to run with pain becoming more constant when she runs over 6 miles. We did discuss shoe wear, and pt may be a candidate for foot/gt assessment. I did give her a discount card for shoe purchase. We did discuss the potential for hip involvement and checked her sartorius.   We did tape her knee for fat pad irritation, and we did discuss how to try this with kinesiotape. We did review insurance benefits. All of pt's questions were answered. Pt was agreeable. Pt may benefit from foot assessment. PLAN: If pt doesn't return, this note can be considered a D/C note. See eval.  Consider fat pad taping again and progressing strengthening.    [] Continue per plan of care [] Alter current plan (see comments above)  [x] Plan of care initiated [] Hold pending MD visit [] Discharge  Electronically signed by: Arnulfo Du, DPT [id number]

## 2022-02-16 NOTE — PLAN OF CARE
100 [facility] Performance and Rehabilitation a Department of [address]  Otilia Farrell [place] [address], [address]  Office: [phone]  Fax:  [phone]                                                          Physical Therapy Certification    Dear Referring Practitioner: Jordan Nixon,    We had the pleasure of evaluating the following patient for physical therapy services at [address] [place] [address]. A summary of our findings can be found in the initial assessment below. This includes our plan of care. If you have any questions or concerns regarding these findings, please do not hesitate to contact me at the office phone number checked above. Thank you for the referral.       Physician Signature:_______________________________Date:__________________  By signing above (or electronic signature), therapists plan is approved by physician      Patient: Josef Green   : 1970   MRN: [id number]  Referring Physician: Referring Practitioner: Jordan Nixon      Evaluation Date: 2022      Medical Diagnosis Information:  Diagnosis: M25.562 (ICD-10-CM) - Left knee pain, unspecified chronicity; possibly fat pad impingement   Treatment Diagnosis: M25.562 (ICD-10-CM) - Left knee pain, unspecified chronicity                                           Precautions/ Contra-indications: left and right meniscus surgeries. Left knee performed 2020   C-SSRS Triggered by Intake questionnaire (Past 2 wk assessment):   [x] No, Questionnaire did not trigger screening.   [] Yes, Patient intake triggered further evaluation      [] C-SSRS Screening completed  [] PCP notified via Plan of Care  [] Emergency services notified   Latex Allergy:  [x]NO      []YES  Preferred Language for Healthcare:   [x]English       []other:    SUBJECTIVE: Patient stated complaint:  The knee has bothered her since the surgery. She has trouble with child's pose. She also has pain with running.   It will be intermittent pain t/o her run. At 6 miles, it really hurts and is more consistent. Before 6 miles, the pain is intermittent. She did get an injection in the medial fat pad. She can get deeper into flexion without as much pain. She runs in a neutral [place] and has started using an orthotic. She is unsure if she needs a new shoe or stop the orthotic. She has been running 3x/wk for about 3 miles. She does feel a sway of her knee going in/out if she is walking, particularly if she is tired. She was going to try to sign up for the Pig 1/2, but she is unsure about that. Relevant Medical History:left and right meniscus surgeries. Left knee performed 5/2020   Functional Scale:   LEFS-86.25%    Pain Scale: 0/10  Easing factors: injection, orthotic, self massage, THC salve, foam roller, ice. Feels sartorius may be tight. She does use a compression sleeve. Provocative factors: running, deep knee flexion- ie child's pose. 7-8/10 with deep knee flexion       Type: [] Constant   [] Intermittent  [] Radiating [] Localized [] other: sharp, intermittent, like a knife     Numbness/Tingling: from incisions. Functional Limitations/Impairments: [] Sitting [] Standing [x] Walking- tired/hiking    [x] Squatting/bending  [] Stairs           [] ADL's  [] Transfers [x] Sports/Recreation [] Other:    Occupation/School: Spiritual leader    Living Status/Prior Level of Function: Independent with ADLs and IADLs, tries to stretch daily; PT HEP; squats, core work, running 3x/wk. (insert highest prior level of function)    OBJECTIVE:     Joint mobility:    [] Normal    [x] Hypo TF jt with good mobility of patella   [] Hyper    Palpation: TTP proximal sartorius/vastus lateralis to rectus femoris    Functional Mobility/Transfers: see above    Posture:  WNL    Bandages/Dressings/Incisions: bruise from injection    Gait: (include devices/WB status) WNL        Flexibility L R Comment   Hamstring      Gastroc ITB      Quad                ROM PROM AROM Overpressure Comment    L R L R L R    Flexion   143 148      Extension   0 0      Hip IR   40 33      Hip ER   30 28      Hip flex   110 122                              Strength L R Comment   Quad 4 some pain 4+    Hamstring 4 4+    Gastroc      Hip flexor 4 4    Hip ABD 4- 4-                  Orthopedic Special Tests:   Special Test Results/Comment   Meniscal Click    Crepitus    Flexion Test    Valgus Laxity    Varus Laxity    Lachmans    Drop Back    Homans            Girth L R   Mid Patella     Suprapatellar     5cm above     15cm above                                [x] Patient history, allergies, meds reviewed. Medical chart reviewed. See intake form. Review Of Systems (ROS):  [x]Performed Review of systems (Integumentary, CardioPulmonary, Neurological) by intake and observation. Intake form has been scanned into medical record. Patient has been instructed to contact their primary care physician regarding ROS issues if not already being addressed at this time.       Co-morbidities/Complexities (which will affect course of rehabilitation):   []None           Arthritic conditions   []Rheumatoid arthritis (M05.9)  []Osteoarthritis (M19.91)   Cardiovascular conditions   []Hypertension (I10)  []Hyperlipidemia (E78.5)  []Angina pectoris (I20)  []Atherosclerosis (I70)   Musculoskeletal conditions   []Disc pathology   []Congenital spine pathologies   [x]Prior surgical intervention  []Osteoporosis (M81.8)  []Osteopenia (M85.8)   Endocrine conditions   []Hypothyroid (E03.9)  []Hyperthyroid Gastrointestinal conditions   []Constipation (D32.00)   Metabolic conditions   []Morbid obesity (E66.01)  []Diabetes type 1(E10.65) or 2 (E11.65)   []Neuropathy (G60.9)     Pulmonary conditions   []Asthma (J45)  []Coughing   []COPD (J44.9)   Psychological Disorders  []Anxiety (F41.9)  []Depression (F32.9)   []Other:   []Other:          Barriers to/and or personal factors that will good body mechanics with sitting, bending, and lifting   []Reduced ability to sleep   [] Reduced ability or tolerance with driving and/or computer work   []Reduced ability to perform lifting, carrying tasks   [x]Reduced ability to squat   []Reduced ability to forward bend   []Reduced ability to ambulate prolonged functional periods/distances/surfaces   []Reduced ability to ascend/descend stairs   [x]Reduced ability to run, hop, cut or jump   []other:    Participation Restrictions   []Reduced participation in self care activities   []Reduced participation in home management activities   []Reduced participation in work activities   []Reduced participation in social activities. []Reduced participation in sport/recreation activities. Classification :    []Signs/symptoms consistent with post-surgical status including decreased ROM, strength and function. []Signs/symptoms consistent with joint sprain/strain   []Signs/symptoms consistent with patella-femoral syndrome   []Signs/symptoms consistent with knee OA/hip OA   []Signs/symptoms consistent with internal derangement of knee/Hip   []Signs/symptoms consistent with functional hip weakness/NMR control      []Signs/symptoms consistent with tendinitis/tendinosis    []signs/symptoms consistent with pathology which may benefit from Dry needling      [x]other: Pt is a 45 y/o female presenting with diagnosis of left knee pain from the MD.  Clinically, the pt presents with decreased ROM, decreased strength, decreased function, and increased pain consistent with the MD diagnosis. The pt would benefit from skilled PT to return to [facility]. Pt was seen in 2020 for left knee meniscus tear s/p surgery in May. She was last seen in PT in July 2020. She states she has been working to return to [facility], but she has never been able to since the surgery. She has been compliant with her HEP going up to 5# on SLR. She has not gone to a gym.   She cannot fully flex her knee without pain or perform child's pose. This also affects her ability to run with pain becoming more constant when she runs over 6 miles. We did discuss shoe wear, and pt may be a candidate for foot/gt assessment. I did give her a discount card for shoe purchase. We did discuss the potential for hip involvement and checked her sartorius. We did tape her knee for fat pad irritation, and we did discuss how to try this with kinesiotape. We did review insurance benefits. All of pt's questions were answered. Pt was agreeable. Pt may benefit from foot assessment. Prognosis/Rehab Potential:      []Excellent   [x]Good    []Fair   []Poor    Tolerance of evaluation/treatment:    []Excellent   [x]Good    []Fair   []Poor    PLAN  Frequency/Duration:  1-2 days per week for 4-6 Weeks:  Interventions:  [x]  Therapeutic exercise including: strength training, ROM, for Lower extremity and core   [x]  NMR activation and proprioception for LE, Glutes and Core   [x]  Manual therapy as indicated for LE, Hip and spine to include: Dry Needling/IASTM, STM, PROM, Gr I-IV mobilizations, manipulation. [x] Modalities as needed that may include: thermal agents, E-stim, Biofeedback, US, iontophoresis as indicated  [x] Patient education on joint protection, postural re-education, activity modification, progression of HEP. HEP instruction: (see scanned forms)       GOALS:   Patient stated goal: no pain with running, deep knee bending without c/o. [] Progressing: [] Met: [] Not Met: [] Adjusted    Therapist goals for Patient:   Short Term Goals: To be achieved in: 2 weeks  1. Independent in HEP and progression per patient tolerance, in order to prevent re-injury. [] Progressing: [] Met: [] Not Met: [] Adjusted   2. Pt will report pain at worst less than or equal to 6/10. [] Progressing: [] Met: [] Not Met: [] Adjusted    Long Term Goals: To be achieved in:  weeks  1.  Pt will demo a score 90% or better for the LEFS to assist with reaching prior level of function. [] Progressing: [] Met: [] Not Met: [] Adjusted  2. Patient will demonstrate increased AROM to knee ext to equal to 0 and knee flex greater than or equal to 145 to allow for proper joint functioning as indicated by patients Functional Deficits. [] Progressing: [] Met: [] Not Met: [] Adjusted  3. Patient will demonstrate an increase in strength to hip flex, hip ABD, and knee flex and ext strength 4+ to allow for proper functional mobility as indicated by patients functional deficits. [] Progressing: [] Met: [] Not Met: [] Adjusted  4. Patient will return to squatting without c/o. [] Progressing: [] Met: [] Not Met: [] Adjusted  5. Pt will report pain at worst less than or equal to 3/10. [] Progressing: [] Met: [] Not Met: [] Adjusted     Physical Therapy Evaluation Complexity Justification  [x] A history of present problem with:  [] no personal factors and/or comorbidities that impact the plan of care;  [x]1-2 personal factors and/or comorbidities that impact the plan of care  []3 personal factors and/or comorbidities that impact the plan of care  [x] An examination of body systems using standardized tests and measures addressing any of the following: body structures and functions (impairments), activity limitations, and/or participation restrictions;:  [] a total of 1-2 or more elements   [x] a total of 3 or more elements   [] a total of 4 or more elements   [x] A clinical presentation with:  [x] stable and/or uncomplicated characteristics   [] evolving clinical presentation with changing characteristics  [] unstable and unpredictable characteristics;   [x] Clinical decision making of [x] low, [] moderate, [] high complexity using standardized patient assessment instrument and/or measurable assessment of functional outcome.     [x] EVAL (LOW) 51608 (typically 20 minutes face-to-face)  [] EVAL (MOD) 47753 (typically 30 minutes face-to-face)  [] EVAL (HIGH) 16948 (typically 45 minutes face-to-face)  [] Luz Dahl [id number]    Electronically signed by:  Dee Carmichael, PT, DPT [id number]

## 2023-04-17 NOTE — PROGRESS NOTES
[facility] ENDOSCOPY COLONOSCOPY PRE-OPERATIVE INSTRUCTIONS    Procedure date__4/20/2023_______  Arrival time___1200_________          Surgery time__1300__________       Clear liquids the day before the procedure. Do not eat or drink anything within 5 hours of your procedure. This includes water chewing gum, mints and ice chips. You may brush your teeth and gargle the morning of your surgery, but do not swallow the water    You may be asked to stop blood thinners such as Coumadin, Plavix, Fragmin, Lovenox, etc., or any anti-inflammatories such as:  Aspirin, Ibuprofen, Advil, Naproxen prior to your procedure. We also ask that you stop any OTC medications such as fish oil, vitamin E, glucosamine, garlic, Multivitamins, COQ 10, etc.    You must make arrangements for a responsible adult to arrive with you and stay in our waiting area during your procedure. They will also need to take you home after your procedure. For your safety you will not be allowed to leave alone or drive yourself home. Also for your safety, it is strongly suggested that someone stay with you the first 24 hours after your procedure. For your comfort, please wear simple loose fitting clothing to the center. Please do not bring valuables. If you have a living will and a durable power of  for healthcare, please bring in a copy.      You will need to bring a photo ID and insurance card    Our goal is to provide you with excellent care so if you have any questions, please contact us at the [facility] at [phone]         Please note these are generalized instructions for all colonoscopy cases, you may be provided with more specific instructions if necessary

## 2023-04-19 ENCOUNTER — ANESTHESIA EVENT (OUTPATIENT)
Dept: ENDOSCOPY | Age: 53
End: 2023-04-19
Payer: COMMERCIAL

## 2023-04-20 ENCOUNTER — HOSPITAL ENCOUNTER (OUTPATIENT)
Age: 53
Setting detail: OUTPATIENT SURGERY
Discharge: HOME | End: 2023-04-20
Attending: INTERNAL MEDICINE | Admitting: INTERNAL MEDICINE
Payer: COMMERCIAL

## 2023-04-20 ENCOUNTER — ANESTHESIA (OUTPATIENT)
Dept: ENDOSCOPY | Age: 53
End: 2023-04-20
Payer: COMMERCIAL

## 2023-04-20 VITALS
BODY MASS INDEX: 23.48 KG/M2 | WEIGHT: 164 LBS | OXYGEN SATURATION: 100 % | RESPIRATION RATE: 16 BRPM | DIASTOLIC BLOOD PRESSURE: 60 MMHG | SYSTOLIC BLOOD PRESSURE: 103 MMHG | TEMPERATURE: 97.4 F | HEART RATE: 54 BPM | HEIGHT: 70 IN

## 2023-04-20 DIAGNOSIS — K63.5 POLYP OF ASCENDING COLON, UNSPECIFIED TYPE: ICD-10-CM

## 2023-04-20 PROCEDURE — 2500000003 HC RX 250 WO HCPCS: Performed by: NURSE ANESTHETIST, CERTIFIED REGISTERED

## 2023-04-20 PROCEDURE — 2580000003 HC RX 258: Performed by: NURSE ANESTHETIST, CERTIFIED REGISTERED

## 2023-04-20 PROCEDURE — 6360000002 HC RX W HCPCS: Performed by: NURSE ANESTHETIST, CERTIFIED REGISTERED

## 2023-04-20 PROCEDURE — 2580000003 HC RX 258: Performed by: ANESTHESIOLOGY

## 2023-04-20 PROCEDURE — 2709999900 HC NON-CHARGEABLE SUPPLY: Performed by: INTERNAL MEDICINE

## 2023-04-20 PROCEDURE — 7100000010 HC PHASE II RECOVERY - FIRST 15 MIN: Performed by: INTERNAL MEDICINE

## 2023-04-20 PROCEDURE — 3700000001 HC ADD 15 MINUTES (ANESTHESIA): Performed by: INTERNAL MEDICINE

## 2023-04-20 PROCEDURE — 7100000011 HC PHASE II RECOVERY - ADDTL 15 MIN: Performed by: INTERNAL MEDICINE

## 2023-04-20 PROCEDURE — 88305 TISSUE EXAM BY PATHOLOGIST: CPT

## 2023-04-20 PROCEDURE — 3609010600 HC COLONOSCOPY POLYPECTOMY SNARE/COLD BIOPSY: Performed by: INTERNAL MEDICINE

## 2023-04-20 PROCEDURE — 3700000000 HC ANESTHESIA ATTENDED CARE: Performed by: INTERNAL MEDICINE

## 2023-04-20 RX ORDER — SODIUM CHLORIDE 0.9 % (FLUSH) 0.9 %
5-40 SYRINGE (ML) INJECTION PRN
Status: DISCONTINUED | OUTPATIENT
Start: 2023-04-20 | End: 2023-04-20 | Stop reason: HOSPADM

## 2023-04-20 RX ORDER — LIDOCAINE HYDROCHLORIDE 20 MG/ML
INJECTION, SOLUTION EPIDURAL; INFILTRATION; INTRACAUDAL; PERINEURAL PRN
Status: DISCONTINUED | OUTPATIENT
Start: 2023-04-20 | End: 2023-04-20 | Stop reason: SDUPTHER

## 2023-04-20 RX ORDER — SODIUM CHLORIDE 9 MG/ML
INJECTION, SOLUTION INTRAVENOUS PRN
Status: CANCELLED | OUTPATIENT
Start: 2023-04-20

## 2023-04-20 RX ORDER — SODIUM CHLORIDE 0.9 % (FLUSH) 0.9 %
5-40 SYRINGE (ML) INJECTION EVERY 12 HOURS SCHEDULED
Status: CANCELLED | OUTPATIENT
Start: 2023-04-20

## 2023-04-20 RX ORDER — SODIUM CHLORIDE 0.9 % (FLUSH) 0.9 %
5-40 SYRINGE (ML) INJECTION PRN
Status: CANCELLED | OUTPATIENT
Start: 2023-04-20

## 2023-04-20 RX ORDER — SODIUM CHLORIDE 9 MG/ML
INJECTION, SOLUTION INTRAVENOUS PRN
Status: DISCONTINUED | OUTPATIENT
Start: 2023-04-20 | End: 2023-04-20 | Stop reason: HOSPADM

## 2023-04-20 RX ORDER — SODIUM CHLORIDE 9 MG/ML
INJECTION, SOLUTION INTRAVENOUS CONTINUOUS PRN
Status: DISCONTINUED | OUTPATIENT
Start: 2023-04-20 | End: 2023-04-20 | Stop reason: SDUPTHER

## 2023-04-20 RX ORDER — LABETALOL HYDROCHLORIDE 5 MG/ML
5 INJECTION, SOLUTION INTRAVENOUS
Status: CANCELLED | OUTPATIENT
Start: 2023-04-20

## 2023-04-20 RX ORDER — MEPERIDINE HYDROCHLORIDE 25 MG/ML
12.5 INJECTION INTRAMUSCULAR; INTRAVENOUS; SUBCUTANEOUS EVERY 5 MIN PRN
Status: CANCELLED | OUTPATIENT
Start: 2023-04-20

## 2023-04-20 RX ORDER — PROPOFOL 10 MG/ML
INJECTION, EMULSION INTRAVENOUS PRN
Status: DISCONTINUED | OUTPATIENT
Start: 2023-04-20 | End: 2023-04-20 | Stop reason: SDUPTHER

## 2023-04-20 RX ORDER — PROPOFOL 10 MG/ML
INJECTION, EMULSION INTRAVENOUS CONTINUOUS PRN
Status: DISCONTINUED | OUTPATIENT
Start: 2023-04-20 | End: 2023-04-20 | Stop reason: SDUPTHER

## 2023-04-20 RX ORDER — SODIUM CHLORIDE 0.9 % (FLUSH) 0.9 %
5-40 SYRINGE (ML) INJECTION EVERY 12 HOURS SCHEDULED
Status: DISCONTINUED | OUTPATIENT
Start: 2023-04-20 | End: 2023-04-20 | Stop reason: HOSPADM

## 2023-04-20 RX ORDER — FENTANYL CITRATE 50 UG/ML
25 INJECTION, SOLUTION INTRAMUSCULAR; INTRAVENOUS EVERY 5 MIN PRN
Status: CANCELLED | OUTPATIENT
Start: 2023-04-20

## 2023-04-20 RX ORDER — ONDANSETRON 2 MG/ML
4 INJECTION INTRAMUSCULAR; INTRAVENOUS
Status: CANCELLED | OUTPATIENT
Start: 2023-04-20 | End: 2023-04-21

## 2023-04-20 RX ORDER — DIPHENHYDRAMINE HYDROCHLORIDE 50 MG/ML
12.5 INJECTION INTRAMUSCULAR; INTRAVENOUS
Status: CANCELLED | OUTPATIENT
Start: 2023-04-20 | End: 2023-04-21

## 2023-04-20 RX ADMIN — PROPOFOL 150 MCG/KG/MIN: 10 INJECTION, EMULSION INTRAVENOUS at 13:24

## 2023-04-20 RX ADMIN — LIDOCAINE HYDROCHLORIDE 50 MG: 20 INJECTION, SOLUTION EPIDURAL; INFILTRATION; INTRACAUDAL; PERINEURAL at 13:24

## 2023-04-20 RX ADMIN — SODIUM CHLORIDE: 9 INJECTION, SOLUTION INTRAVENOUS at 12:57

## 2023-04-20 RX ADMIN — SODIUM CHLORIDE: 9 INJECTION, SOLUTION INTRAVENOUS at 13:19

## 2023-04-20 RX ADMIN — PROPOFOL 80 MG: 10 INJECTION, EMULSION INTRAVENOUS at 13:24

## 2023-04-20 ASSESSMENT — ENCOUNTER SYMPTOMS: SHORTNESS OF BREATH: 0

## 2023-04-20 ASSESSMENT — LIFESTYLE VARIABLES: SMOKING_STATUS: 0

## 2023-04-20 ASSESSMENT — PAIN - FUNCTIONAL ASSESSMENT: PAIN_FUNCTIONAL_ASSESSMENT: NONE - DENIES PAIN

## 2023-04-20 NOTE — PROCEDURES
Colonoscopy Procedure  Note          Patient: Blaze Dear  : 1970      Procedure: Colonoscopy with cold snare polypectomy    Date:  2023    Primary Care Physician: Gee Urbano MD     Operative surgeon: Reema Anderson MD  Previous Colonoscopy: Yes  Consent: I explained and discussed the risk, benefits and alternatives for the procedure with the patient and obtained the patient's consent for the procedure. We discussed the specific risks including bleeding, perforation, post-procedure abdominal pain, and missed lesions which could lead to interval colorectal cancers. History:       Past Medical History:   Diagnosis Date    Anesthesia complication     wakes up with all types anesthesia    Endometriosis     Hyperlipidemia     borderline    Retention of urine     only after bladder suspension    Torn meniscus     right knee with mild OA      Preoperative Diagnosis: hx of colon polyps   Post Operative Diagnosis: Colon polyps, large internal and external hemorrhoids  ASA: 3  SEDATION: MAC      Procedures Performed: Colonoscopy   Scope Type: Pediatric    Procedure Details:      With the patient in left lateral decubitus position the endoscope was inserted through the anorectal area into the rectum. The scope was then advanced through the length of the colon to the cecum and terminal ileum. The quality of preparation was good. The scope was carefully withdrawn with careful inspection. Images were taken of the multiple segments of colon, cecum, IC valve, rectum, terminal ileum. Retroflexion was preformed in the rectum.        Cecum Intubated: Yes  EBL: minimal to none  Complications:  no complications were noted  Post-operative Findings: Perianal exam demonstrated skin tags external hemorrhoids digital rectal exam and retroflexion rectum demonstrated large internal hemorrhoids    At the rectosigmoid there was a 4 mm sessile polyp removed with cold snare resection retrieval complete    In the

## 2023-04-20 NOTE — ANESTHESIA POSTPROCEDURE EVALUATION
Department of Anesthesiology  Postprocedure Note    Patient: Lorraine Mason  MRN: [id number]  YOB: 1970  Date of evaluation: 4/20/2023      Procedure Summary     Date: 04/20/23 Room / Location: [address]    Anesthesia Start: 1319 Anesthesia Stop: 7306    Procedure: COLONOSCOPY POLYPECTOMY SNARE/COLD BIOPSY Diagnosis:       Polyp of ascending colon, unspecified type      (Polyp of ascending colon, unspecified type)    Surgeons: Abbie Noguera MD Responsible Provider: Jg Newman MD    Anesthesia Type: MAC ASA Status: 2          Anesthesia Type: No value filed.     Sree Phase I: Sree Score: 10    Sree Phase II: Sree Score: 10      Anesthesia Post Evaluation    Patient location during evaluation: PACU  Patient participation: complete - patient participated  Level of consciousness: awake  Airway patency: patent  Nausea & Vomiting: no nausea  Complications: no  Cardiovascular status: hemodynamically stable  Respiratory status: acceptable  Hydration status: euvolemic  Multimodal analgesia pain management approach

## 2023-04-20 NOTE — DISCHARGE INSTRUCTIONS
Endoscopy Discharge Instructions    Call with any questions or concerns. You may be drowsy or lightheaded after receiving sedation. DO NOT operate  a vehicle (automobile, bicycle, motorcycle, machinery, or power tools), no  alcoholic beverages, and do not make any important decisions today. Plan on bed rest or quiet relaxation today. Resume normal activities in the morning. Resume normal activity tomorrow unless otherwise advised by your physician. Eat a light first meal, avoiding spicy and fatty foods, then resume normal diet unless you are told otherwise by your physician. If the intravenous medication site is painful, apply warm compresses on the site until the soreness is relieved and elevate the arm above the heart. Call your physician if no improvement  in 2-3 days. POSSIBLE SYMPTOMS TO WATCH:     1. fever (greater than 100) 5. increased abdominal bloating   2. severe pain   6. excessive bleeding   3. nausea and vomiting  7. chest pain   4. chills    8. shortness of breath       Notify if these problems occur     Expected as normal and remedies:  Sore throat: use over the counter throat lozenges or gargle with warm salt water. Redness or soreness at the IV site: apply warm compress  Gaseous discomfort: belching or passing flatus (gas). Ina Holt MD        With questions or concerns call . Pathology will show up on the [address] [place] [address] portal with my attestation attached. This is different than your MyChart.

## 2023-04-20 NOTE — H&P
[address]   Pre-operative History and Physical    Patient: Karl Herbert  : 1970  Acct#:     HISTORY OF PRESENT ILLNESS:    The patient is a 45 y.o. female who presents for hx of colon polyps     Indications: hx of colon polyps     Past Medical History:        Diagnosis Date    Anesthesia complication     wakes up with all types anesthesia    Endometriosis     Hyperlipidemia     borderline    Retention of urine     only after bladder suspension    Torn meniscus     right knee with mild OA      Past Surgical History:        Procedure Laterality Date    BLADDER SUSPENSION      BUNIONECTOMY Right     CARDIAC CATHETERIZATION      normal    KNEE ARTHROSCOPY Right 10/23/2019    RIGHT KNEE ARTHROSCOPY MEDIAL MENISCECTOMY performed by Ramakrishna Morin MD at [address] [facility] ARTHROSCOPY Left 05/15/2020    LEFT KNEE ARTHROSCOPY, MEDIAL MENISCECTOMY performed by Ramakrishna Morin MD at [address] [place] [address] Left     RECTOCELE REPAIR      WISDOM TOOTH EXTRACTION        Medications Prior to Admission:   No current facility-administered medications on file prior to encounter. Current Outpatient Medications on File Prior to Encounter   Medication Sig Dispense Refill    ferrous sulfate (IRON 325) 325 (65 Fe) MG tablet Take 1 tablet by mouth      turmeric 500 MG CAPS Take by mouth      Omega-3 Fatty Acids (FISH OIL PO) Take by mouth      Nutritional Supplements (RHEUMATOID ARTHRITIS SUPPORT PO) Take by mouth          Allergies:  Codeine    Social History:   Social History     Socioeconomic History    Marital status:      Spouse name: Not on file    Number of children: Not on file    Years of education: Not on file    Highest education level: Not on file   Occupational History    Occupation: massage therapist, spiritual director   Tobacco Use    Smoking status: Never    Smokeless tobacco: Never   Vaping Use    Vaping Use: Never used   Substance and Sexual Activity    Alcohol use:  Yes

## 2023-04-20 NOTE — ANESTHESIA POSTPROCEDURE EVALUATION
Department of Anesthesiology  Postprocedure Note    Patient: Michel Waldron  MRN: [id number]  YOB: 1970  Date of evaluation: 4/20/2023      Procedure Summary     Date: 04/20/23 Room / Location: [address]    Anesthesia Start: 1319 Anesthesia Stop: 4203    Procedure: COLONOSCOPY POLYPECTOMY SNARE/COLD BIOPSY Diagnosis:       Polyp of ascending colon, unspecified type      (Polyp of ascending colon, unspecified type)    Surgeons: Felipe Alston MD Responsible Provider: Nimco Donovan MD    Anesthesia Type: MAC ASA Status: 2          Anesthesia Type: No value filed.     Sree Phase I: Sree Score: 10    Sree Phase II: Sree Score: 10      Anesthesia Post Evaluation    Patient location during evaluation: PACU  Patient participation: complete - patient participated  Level of consciousness: awake  Airway patency: patent  Nausea & Vomiting: no nausea  Complications: no  Cardiovascular status: hemodynamically stable  Respiratory status: acceptable  Hydration status: euvolemic  Multimodal analgesia pain management approach

## 2023-04-20 NOTE — ANESTHESIA PRE PROCEDURE
Department of Anesthesiology  Preprocedure Note       Name:  Lavonne Thomason   Age:  45 y.o.  :  1970                                          MRN:  [id number]         Date:  2023      Surgeon: Lauri Mendez):  Trip Syed MD    Procedure: Procedure(s):  COLONOSCOPY DIAGNOSTIC    Medications prior to admission:   Prior to Admission medications    Medication Sig Start Date End Date Taking? Authorizing Provider   ferrous sulfate (IRON 325) 325 (65 Fe) MG tablet Take 1 tablet by mouth    Historical Provider, MD   turmeric 500 MG CAPS Take by mouth    Historical Provider, MD   Omega-3 Fatty Acids (FISH OIL PO) Take by mouth    Historical Provider, MD   Nutritional Supplements (RHEUMATOID ARTHRITIS SUPPORT PO) Take by mouth    Historical Provider, MD       Current medications:    Current Facility-Administered Medications   Medication Dose Route Frequency Provider Last Rate Last Admin    sodium chloride flush 0.9 % injection 5-40 mL  5-40 mL IntraVENous 2 times per day Dex Vargas MD        sodium chloride flush 0.9 % injection 5-40 mL  5-40 mL IntraVENous PRN Dex Vargas MD        0.9 % sodium chloride infusion   IntraVENous PRN Dex Vargas MD 50 mL/hr at 23 1257 New Bag at 23 1257       Allergies:     Allergies   Allergen Reactions    Codeine Other (See Comments)     Drops heart rate  As child bp dropped       Problem List:    Patient Active Problem List   Diagnosis Code    Tear of medial meniscus of left knee, current E69.037A       Past Medical History:        Diagnosis Date    Anesthesia complication     wakes up with all types anesthesia    Endometriosis     Hyperlipidemia     borderline    Retention of urine     only after bladder suspension    Torn meniscus     right knee with mild OA       Past Surgical History:        Procedure Laterality Date    BLADDER SUSPENSION      BUNIONECTOMY Right     CARDIAC CATHETERIZATION      normal    KNEE ARTHROSCOPY Right 10/23/2019

## 2023-09-26 ENCOUNTER — OFFICE VISIT (OUTPATIENT)
Age: 53
End: 2023-09-26

## 2023-09-26 VITALS
DIASTOLIC BLOOD PRESSURE: 78 MMHG | TEMPERATURE: 98.4 F | WEIGHT: 176 LBS | HEART RATE: 72 BPM | SYSTOLIC BLOOD PRESSURE: 112 MMHG | OXYGEN SATURATION: 97 % | BODY MASS INDEX: 25.62 KG/M2

## 2023-09-26 DIAGNOSIS — R35.0 URINARY FREQUENCY: Primary | ICD-10-CM

## 2023-09-26 LAB
APPEARANCE FLUID: CLEAR
BILIRUBIN, POC: NEGATIVE
BLOOD URINE, POC: NEGATIVE
CLARITY, POC: CLEAR
COLOR, POC: YELLOW
GLUCOSE URINE, POC: NEGATIVE
KETONES, POC: ABNORMAL
LEUKOCYTE EST, POC: NEGATIVE
NITRITE, POC: NEGATIVE
PH, POC: 5.5
PROTEIN, POC: NEGATIVE
SPECIFIC GRAVITY, POC: 1.02
UROBILINOGEN, POC: 0.2

## 2023-09-26 ASSESSMENT — ENCOUNTER SYMPTOMS
DIARRHEA: 0
VOMITING: 0
CONSTIPATION: 0
SHORTNESS OF BREATH: 0
COUGH: 0
NAUSEA: 0

## 2023-09-26 NOTE — PATIENT INSTRUCTIONS
Follow up with PCP to recheck urine  Will message through [employer] with Urine Cx results - please don't hesitate to call with any questions.

## 2023-09-27 LAB — BACTERIA UR CULT: NORMAL

## 2024-01-19 ENCOUNTER — TELEPHONE (OUTPATIENT)
Dept: ORTHOPEDIC SURGERY | Age: 54
End: 2024-01-19

## 2024-01-19 RX ORDER — ESTRADIOL 1 MG/1
1 TABLET ORAL DAILY
COMMUNITY
Start: 2024-01-12

## 2024-01-19 RX ORDER — PROGESTERONE 100 MG/1
100 CAPSULE ORAL DAILY
COMMUNITY
Start: 2024-01-12

## 2024-01-19 NOTE — TELEPHONE ENCOUNTER
General Question     Subject: FETT RECOMMENDATION  Patient and /or Facility Request: Nila Roche \"Linh\"   Contact Number: [phone]     PATIENT CALLING REGARDING LOOKING FOR A RECOMMENDATION FOR HE FEET.    PLEASE CALL BACK PATIENT AT THE ABOVE NUMBER.

## 2024-01-19 NOTE — TELEPHONE ENCOUNTER
Called and spoke with patient. Patient states that she thinks she has plantar fascitis but is questioning if it could be a stress fracture. Patient scheduled for 1-22-24 0900.

## 2024-01-22 ENCOUNTER — OFFICE VISIT (OUTPATIENT)
Dept: ORTHOPEDIC SURGERY | Age: 54
End: 2024-01-22
Payer: COMMERCIAL

## 2024-01-22 VITALS — BODY MASS INDEX: 25.18 KG/M2 | HEIGHT: 69 IN | WEIGHT: 170 LBS | RESPIRATION RATE: 12 BRPM

## 2024-01-22 DIAGNOSIS — M79.672 PAIN OF LEFT HEEL: Primary | ICD-10-CM

## 2024-01-22 DIAGNOSIS — M72.2 PLANTAR FASCIITIS OF LEFT FOOT: ICD-10-CM

## 2024-01-22 PROCEDURE — 3017F COLORECTAL CA SCREEN DOC REV: CPT | Performed by: ORTHOPAEDIC SURGERY

## 2024-01-22 PROCEDURE — G8427 DOCREV CUR MEDS BY ELIG CLIN: HCPCS | Performed by: ORTHOPAEDIC SURGERY

## 2024-01-22 PROCEDURE — G8484 FLU IMMUNIZE NO ADMIN: HCPCS | Performed by: ORTHOPAEDIC SURGERY

## 2024-01-22 PROCEDURE — 1036F TOBACCO NON-USER: CPT | Performed by: ORTHOPAEDIC SURGERY

## 2024-01-22 PROCEDURE — L4360 PNEUMAT WALKING BOOT PRE CST: HCPCS | Performed by: ORTHOPAEDIC SURGERY

## 2024-01-22 PROCEDURE — 99214 OFFICE O/P EST MOD 30 MIN: CPT | Performed by: ORTHOPAEDIC SURGERY

## 2024-01-22 PROCEDURE — G8419 CALC BMI OUT NRM PARAM NOF/U: HCPCS | Performed by: ORTHOPAEDIC SURGERY

## 2024-01-22 NOTE — PROGRESS NOTES
Nila Roche is seen today for evaluation and treatment of her left heel.  She has Planter fasciitis per her report.  Is been going on since June.  She went on a 5-day \"[employer]\" retreat where she barely moved for 5 days and then the next week and a 30 mile hike.  After that she began having significant pain.  It hurts in the heel sometimes up toward the Achilles.  Pain is 4 out of 10.  It is bad in the morning.  She has been aggressive with stretching, she uses heat, she uses ibuprofen, she uses CBD cream and different essential oils.  She is a massage therapist.  She has historically been a runner can barely go for long hikes at this point because of her pain.        History: Patient's relevant past family, medical, and social history are reviewed as part of today's visit. ROS of pertinent positives and negatives as above; otherwise negative.    General Exam:    Vitals: Resp. rate 12, height 1.753 m (5' 9\"), weight 77.1 kg (170 lb), last menstrual period 08/03/2016, not currently breastfeeding.  Constitutional: Patient is adequately groomed with no evidence of malnutrition  Mental Status: The patient is oriented to time, place and person.  The patient's mood and affect are appropriate.  Gait:  Patient walks with normal gait and station.  Lymphatic: The lymphatic examination bilaterally reveals all areas to be without enlargement or induration.  Vascular: Examination reveals no swelling or calf tenderness.  Peripheral pulses are palpable and 2+.  Neurological: The patient has good coordination.  There is no weakness or sensory deficit.    Skin:    Head/Neck: inspection reveals no rashes, ulcerations or lesions.  Trunk:  inspection reveals no rashes, ulcerations or lesions.  Right Lower Extremity: inspection reveals no rashes, ulcerations or lesions.  Left Lower Extremity: inspection reveals no rashes, ulcerations or lesions.    Right foot exam is normal.    Foot has mild bunion.  She has tenderness at the

## 2024-03-18 ENCOUNTER — OFFICE VISIT (OUTPATIENT)
Dept: ORTHOPEDIC SURGERY | Age: 54
End: 2024-03-18
Payer: COMMERCIAL

## 2024-03-18 VITALS — HEIGHT: 69 IN | WEIGHT: 170 LBS | BODY MASS INDEX: 25.18 KG/M2

## 2024-03-18 DIAGNOSIS — M72.2 PLANTAR FASCIITIS OF LEFT FOOT: ICD-10-CM

## 2024-03-18 DIAGNOSIS — M79.672 PAIN OF LEFT HEEL: Primary | ICD-10-CM

## 2024-03-18 PROCEDURE — 99212 OFFICE O/P EST SF 10 MIN: CPT | Performed by: ORTHOPAEDIC SURGERY

## 2024-03-18 PROCEDURE — G8427 DOCREV CUR MEDS BY ELIG CLIN: HCPCS | Performed by: ORTHOPAEDIC SURGERY

## 2024-03-18 PROCEDURE — 3017F COLORECTAL CA SCREEN DOC REV: CPT | Performed by: ORTHOPAEDIC SURGERY

## 2024-03-18 PROCEDURE — G8484 FLU IMMUNIZE NO ADMIN: HCPCS | Performed by: ORTHOPAEDIC SURGERY

## 2024-03-18 PROCEDURE — 1036F TOBACCO NON-USER: CPT | Performed by: ORTHOPAEDIC SURGERY

## 2024-03-18 PROCEDURE — G8419 CALC BMI OUT NRM PARAM NOF/U: HCPCS | Performed by: ORTHOPAEDIC SURGERY

## 2024-03-18 NOTE — PROGRESS NOTES
Nila Roche returns today for her left foot Planter fasciitis.  She wear the boot for 5 weeks and is very consistent with her stretching but did not feel like she has had any improvement in symptoms.  With walking pain is 5 out of 10 at least.    She sometimes has pain that migrates a bit proximally.    General Exam:    Vitals: Height 1.753 m (5' 9.02\"), weight 77.1 kg (170 lb), last menstrual period 08/03/2016, not currently breastfeeding.  Constitutional: Patient is adequately groomed with no evidence of malnutrition  Mental Status: The patient is oriented to time, place and person.  The patient's mood and affect are appropriate.    Left foot has severe tenderness at the plantar fascia origin but no pain in the calf today.  She has mild pain over the lateral column.    Assessment: Refractory left foot pain despite appropriate conservative management    Plan: MRI left foot to determine the next appropriate step in treatment.    Follow-up with me after the scan.

## 2024-03-22 ENCOUNTER — TELEPHONE (OUTPATIENT)
Dept: ORTHOPEDIC SURGERY | Age: 54
End: 2024-03-22

## 2024-03-22 NOTE — TELEPHONE ENCOUNTER
Called and left message for patient to return my call. Insurance is asking for results of repeat X-rays to make a determination on MRI authorization request.

## 2024-11-26 ENCOUNTER — OFFICE VISIT (OUTPATIENT)
Dept: ORTHOPEDIC SURGERY | Age: 54
End: 2024-11-26
Payer: COMMERCIAL

## 2024-11-26 VITALS — RESPIRATION RATE: 12 BRPM | WEIGHT: 170 LBS | BODY MASS INDEX: 25.18 KG/M2 | HEIGHT: 69 IN

## 2024-11-26 DIAGNOSIS — M79.671 RIGHT FOOT PAIN: Primary | ICD-10-CM

## 2024-11-26 DIAGNOSIS — M72.2 PLANTAR FASCIAL FIBROMATOSIS OF RIGHT FOOT: ICD-10-CM

## 2024-11-26 PROCEDURE — 1036F TOBACCO NON-USER: CPT | Performed by: ORTHOPAEDIC SURGERY

## 2024-11-26 PROCEDURE — G8484 FLU IMMUNIZE NO ADMIN: HCPCS | Performed by: ORTHOPAEDIC SURGERY

## 2024-11-26 PROCEDURE — G8419 CALC BMI OUT NRM PARAM NOF/U: HCPCS | Performed by: ORTHOPAEDIC SURGERY

## 2024-11-26 PROCEDURE — 99214 OFFICE O/P EST MOD 30 MIN: CPT | Performed by: ORTHOPAEDIC SURGERY

## 2024-11-26 PROCEDURE — G8427 DOCREV CUR MEDS BY ELIG CLIN: HCPCS | Performed by: ORTHOPAEDIC SURGERY

## 2024-11-26 PROCEDURE — 3017F COLORECTAL CA SCREEN DOC REV: CPT | Performed by: ORTHOPAEDIC SURGERY

## 2024-11-26 SDOH — HEALTH STABILITY: PHYSICAL HEALTH: ON AVERAGE, HOW MANY DAYS PER WEEK DO YOU ENGAGE IN MODERATE TO STRENUOUS EXERCISE (LIKE A BRISK WALK)?: 3 DAYS

## 2024-11-26 NOTE — PROGRESS NOTES
Nila Roche is seen today for a knot in the arch of her right foot.  She noticed it after a backpacking trip in October.  She has some plantar fascia type symptoms with morning pain and discomfort can be about 4 out of 10.  She has tried CBD cream, essential oils, massage, and light therapy.  Her pain is mostly in the mornings.  She has a history of a bunionectomy in 1994.    She works as a massage therapist.        History: Patient's relevant past family, medical, and social history are reviewed as part of today's visit. ROS of pertinent positives and negatives as above; otherwise negative.    General Exam:    Vitals: Resp. rate 12, height 1.753 m (5' 9\"), weight 77.1 kg (170 lb), last menstrual period 08/03/2016, not currently breastfeeding.  Constitutional: Patient is adequately groomed with no evidence of malnutrition  Mental Status: The patient is oriented to time, place and person.  The patient's mood and affect are appropriate.  Gait:  Patient walks with normal gait and station.  Lymphatic: The lymphatic examination bilaterally reveals all areas to be without enlargement or induration.  Vascular: Examination reveals no swelling or calf tenderness.  Peripheral pulses are palpable and 2+.  Neurological: The patient has good coordination.  There is no weakness or sensory deficit.    Skin:    Head/Neck: inspection reveals no rashes, ulcerations or lesions.  Trunk:  inspection reveals no rashes, ulcerations or lesions.  Right Lower Extremity: inspection reveals no rashes, ulcerations or lesions.  Left Lower Extremity: inspection reveals no rashes, ulcerations or lesions.    Left foot is benign.    Right foot has a supple not in the midpoint of the arch.  It is minimally tender.  Calf is soft.  Neurologic and vascular exams are normal.    X-rays obtained today in the office and interpreted by me,  axial of the calcaneus and a lateral of the right foot demonstrate: No acute bony abnormalities.  She has mild

## 2025-01-07 ENCOUNTER — OFFICE VISIT (OUTPATIENT)
Age: 55
End: 2025-01-07

## 2025-01-07 VITALS
HEART RATE: 60 BPM | OXYGEN SATURATION: 98 % | TEMPERATURE: 98 F | SYSTOLIC BLOOD PRESSURE: 99 MMHG | WEIGHT: 176 LBS | BODY MASS INDEX: 25.99 KG/M2 | DIASTOLIC BLOOD PRESSURE: 65 MMHG

## 2025-01-07 DIAGNOSIS — H60.542 DERMATITIS OF LEFT EAR CANAL: ICD-10-CM

## 2025-01-07 DIAGNOSIS — J01.00 ACUTE NON-RECURRENT MAXILLARY SINUSITIS: Primary | ICD-10-CM

## 2025-01-07 ASSESSMENT — ENCOUNTER SYMPTOMS
SINUS PAIN: 1
COUGH: 1
RHINORRHEA: 1
SINUS PRESSURE: 1

## 2025-01-07 NOTE — PROGRESS NOTES
Nila Roche (: 1970) is a 54 y.o. female, Established patient, here for evaluation of the following chief complaint(s):  Cough      ASSESSMENT/PLAN:    ICD-10-CM    1. Acute non-recurrent maxillary sinusitis  J01.00 amoxicillin-clavulanate (AUGMENTIN) 875-125 MG per tablet      2. Dermatitis of left ear canal  H60.542 Ezequiel Ferrell MD, Otolaryngology, [facility]              Patient exam as below. Patient was ambulatory, nontoxic, and medically stable.    History obtained from patient.     The patient's external records have been reviewed.      Differential diagnosis considered. Overall presentation is consistent with acute maxillary sinusitis.     Nila Roche will be treated outpatient with Augmentin, referral to ENT.     Disposition: Discharged with instructions to obtain outpatient follow up via their primary care provider in the next 3 to 5 days, with strict return precautions if patient develops new or worsening symptoms.       SUBJECTIVE/OBJECTIVE:  In brief, Linh is a generally well-appearing 54-year-old female who presents today with 2-week history of maxillary sinus pain and pressure, left ear irritation and itching, cough with production of green tinted mucus, and general malaise.  She states that she has not been experiencing a fever and denies any chest pain, shortness of breath, or activity intolerance.  She states that she has had no known exposures to known illnesses that she is aware of and notes that she is generally healthy when she is at her baseline otherwise.  States she has no other acute medical concerns at this time.          VITAL SIGNS  Vitals:    25 1222   BP: 99/65   Site: Left Upper Arm   Position: Sitting   Cuff Size: Large Adult   Pulse: 60   Temp: 98 °F (36.7 °C)   TempSrc: Oral   SpO2: 98%   Weight: 79.8 kg (176 lb)       Review of Systems   HENT:  Positive for congestion, ear pain, postnasal drip, rhinorrhea, sinus pressure and sinus

## 2025-01-07 NOTE — PATIENT INSTRUCTIONS
Increase fluids and rest     Tylenol or Ibuprofen for pain or fever as directed     A cold mist humidifier can help improve mucous clearance     Mucinex over the counter can help break up mucous     Start using Flonase.  It will take several days to start working.  The generic works just as well as brand name and is fine to use as well.     Warm compresses on the face may help with the pain     Follow up with your primary provider in 3-5 days if symptoms are not improving

## (undated) DEVICE — Device

## (undated) DEVICE — 4-PORT MANIFOLD: Brand: NEPTUNE 2

## (undated) DEVICE — COVER LT HNDL BLU PLAS

## (undated) DEVICE — SOLUTION IV IRRIG LACTATED RINGERS 3000ML 2B7487

## (undated) DEVICE — BLADE SHV L13CM DIA4MM TAPR TIP SCIS LIKE CUT OVL OUTER

## (undated) DEVICE — 3M™ STERI-DRAPE™ U-DRAPE 1015: Brand: STERI-DRAPE™

## (undated) DEVICE — TUBING PMP L16FT MAIN DISP FOR AR-6400 AR-6475

## (undated) DEVICE — STRIP,CLOSURE,WOUND,MEDI-STRIP,1/2X4: Brand: MEDLINE

## (undated) DEVICE — SUTURE NONABSORBABLE MONOFILAMENT 4-0 FS-2 18 IN ETHILON 662H

## (undated) DEVICE — STERILE POLYISOPRENE POWDER-FREE SURGICAL GLOVES: Brand: PROTEXIS

## (undated) DEVICE — SNARE ENDOSCP POLYP 2.4 MM 240 CM 10 MM 2.8 MM CAPTIVATOR

## (undated) DEVICE — GOWN,REINF,POLY,AURORA,XLNG/XL,STRL: Brand: MEDLINE

## (undated) DEVICE — ALCOHOL RUBBING ISO 16OZ 70%

## (undated) DEVICE — STERILE LATEX POWDER-FREE SURGICAL GLOVESWITH NITRILE COATING: Brand: PROTEXIS

## (undated) DEVICE — ZIMMER® STERILE DISPOSABLE TOURNIQUET CUFF WITH PLC, DUAL PORT, SINGLE BLADDER, 34 IN. (86 CM)

## (undated) DEVICE — HYPODERMIC SAFETY NEEDLE: Brand: MAGELLAN

## (undated) DEVICE — MERCY HEALTH WEST TURNOVER: Brand: MEDLINE INDUSTRIES, INC.

## (undated) DEVICE — TUBING, SUCTION, 1/4" X 12', STRAIGHT: Brand: MEDLINE

## (undated) DEVICE — CHLORAPREP 26ML ORANGE

## (undated) DEVICE — SHEET,DRAPE,53X77,STERILE: Brand: MEDLINE

## (undated) DEVICE — KIT OR ROOM TURNOVER W/STRAP